# Patient Record
Sex: FEMALE | Race: WHITE | Employment: OTHER | URBAN - METROPOLITAN AREA
[De-identification: names, ages, dates, MRNs, and addresses within clinical notes are randomized per-mention and may not be internally consistent; named-entity substitution may affect disease eponyms.]

---

## 2017-08-23 ENCOUNTER — HOSPITAL ENCOUNTER (OUTPATIENT)
Dept: OTHER | Age: 67
Discharge: HOME | End: 2017-08-23
Payer: MEDICARE

## 2017-08-23 ENCOUNTER — HOSPITAL ENCOUNTER (OUTPATIENT)
Dept: PREADMISSION TESTING | Age: 67
Discharge: HOME | End: 2017-08-23
Payer: MEDICARE

## 2017-08-23 ENCOUNTER — HOSPITAL ENCOUNTER (OUTPATIENT)
Dept: LAB | Age: 67
Discharge: HOME | End: 2017-08-23
Payer: MEDICARE

## 2017-08-23 DIAGNOSIS — Z01.818 PRE-OP TESTING: ICD-10-CM

## 2017-08-23 DIAGNOSIS — M43.16 SPONDYLOLISTHESIS OF LUMBAR REGION: ICD-10-CM

## 2017-08-23 DIAGNOSIS — M41.9 KYPHOSCOLIOSIS: ICD-10-CM

## 2017-08-23 DIAGNOSIS — M48.061 SPINAL STENOSIS, LUMBAR REGION, WITHOUT NEUROGENIC CLAUDICATION: ICD-10-CM

## 2017-08-23 LAB
ABO + RH BLD: NORMAL
ALBUMIN SERPL-MCNC: 3.9 G/DL (ref 3.4–5)
ALBUMIN/GLOB SERPL: 1.1 {RATIO} (ref 0.8–1.7)
ALP SERPL-CCNC: 81 U/L (ref 45–117)
ALT SERPL-CCNC: 28 U/L (ref 13–56)
ANION GAP SERPL CALC-SCNC: 7 MMOL/L (ref 3–18)
APPEARANCE UR: CLEAR
APTT PPP: 28.1 SEC (ref 23–36.4)
AST SERPL-CCNC: 21 U/L (ref 15–37)
ATRIAL RATE: 87 BPM
BACTERIA URNS QL MICRO: NEGATIVE /HPF
BASOPHILS # BLD: 0 K/UL (ref 0–0.06)
BASOPHILS NFR BLD: 0 % (ref 0–2)
BILIRUB SERPL-MCNC: 0.6 MG/DL (ref 0.2–1)
BILIRUB UR QL: NEGATIVE
BLOOD GROUP ANTIBODIES SERPL: NORMAL
BUN SERPL-MCNC: 11 MG/DL (ref 7–18)
BUN/CREAT SERPL: 16 (ref 12–20)
CALCIUM SERPL-MCNC: 8.9 MG/DL (ref 8.5–10.1)
CALCULATED P AXIS, ECG09: 59 DEGREES
CALCULATED R AXIS, ECG10: 30 DEGREES
CALCULATED T AXIS, ECG11: 55 DEGREES
CHLORIDE SERPL-SCNC: 104 MMOL/L (ref 100–108)
CO2 SERPL-SCNC: 26 MMOL/L (ref 21–32)
COLOR UR: YELLOW
CREAT SERPL-MCNC: 0.68 MG/DL (ref 0.6–1.3)
DIAGNOSIS, 93000: NORMAL
DIFFERENTIAL METHOD BLD: ABNORMAL
EOSINOPHIL # BLD: 0 K/UL (ref 0–0.4)
EOSINOPHIL NFR BLD: 0 % (ref 0–5)
EPITH CASTS URNS QL MICRO: ABNORMAL /LPF (ref 0–5)
ERYTHROCYTE [DISTWIDTH] IN BLOOD BY AUTOMATED COUNT: 14.7 % (ref 11.6–14.5)
GLOBULIN SER CALC-MCNC: 3.6 G/DL (ref 2–4)
GLUCOSE SERPL-MCNC: 86 MG/DL (ref 74–99)
GLUCOSE UR STRIP.AUTO-MCNC: NEGATIVE MG/DL
HCT VFR BLD AUTO: 40 % (ref 35–45)
HGB BLD-MCNC: 13.1 G/DL (ref 12–16)
HGB UR QL STRIP: NEGATIVE
INR PPP: 1.1 (ref 0.8–1.2)
KETONES UR QL STRIP.AUTO: NEGATIVE MG/DL
LEUKOCYTE ESTERASE UR QL STRIP.AUTO: ABNORMAL
LYMPHOCYTES # BLD: 2.4 K/UL (ref 0.9–3.6)
LYMPHOCYTES NFR BLD: 33 % (ref 21–52)
MCH RBC QN AUTO: 29.3 PG (ref 24–34)
MCHC RBC AUTO-ENTMCNC: 32.8 G/DL (ref 31–37)
MCV RBC AUTO: 89.5 FL (ref 74–97)
MONOCYTES # BLD: 0.8 K/UL (ref 0.05–1.2)
MONOCYTES NFR BLD: 12 % (ref 3–10)
NEUTS SEG # BLD: 4 K/UL (ref 1.8–8)
NEUTS SEG NFR BLD: 55 % (ref 40–73)
NITRITE UR QL STRIP.AUTO: NEGATIVE
P-R INTERVAL, ECG05: 160 MS
PH UR STRIP: 7.5 [PH] (ref 5–8)
PLATELET # BLD AUTO: 306 K/UL (ref 135–420)
PMV BLD AUTO: 10.8 FL (ref 9.2–11.8)
POTASSIUM SERPL-SCNC: 4.4 MMOL/L (ref 3.5–5.5)
PROT SERPL-MCNC: 7.5 G/DL (ref 6.4–8.2)
PROT UR STRIP-MCNC: NEGATIVE MG/DL
PROTHROMBIN TIME: 13.2 SEC (ref 11.5–15.2)
Q-T INTERVAL, ECG07: 338 MS
QRS DURATION, ECG06: 82 MS
QTC CALCULATION (BEZET), ECG08: 406 MS
RBC # BLD AUTO: 4.47 M/UL (ref 4.2–5.3)
RBC #/AREA URNS HPF: 0 /HPF (ref 0–5)
SODIUM SERPL-SCNC: 137 MMOL/L (ref 136–145)
SP GR UR REFRACTOMETRY: 1.01 (ref 1–1.03)
SPECIMEN EXP DATE BLD: NORMAL
UROBILINOGEN UR QL STRIP.AUTO: 0.2 EU/DL (ref 0.2–1)
VENTRICULAR RATE, ECG03: 87 BPM
WBC # BLD AUTO: 7.2 K/UL (ref 4.6–13.2)
WBC URNS QL MICRO: ABNORMAL /HPF (ref 0–4)

## 2017-08-23 PROCEDURE — 81001 URINALYSIS AUTO W/SCOPE: CPT | Performed by: NEUROLOGICAL SURGERY

## 2017-08-23 PROCEDURE — 85730 THROMBOPLASTIN TIME PARTIAL: CPT | Performed by: NEUROLOGICAL SURGERY

## 2017-08-23 PROCEDURE — 71020 XR CHEST PA LAT: CPT

## 2017-08-23 PROCEDURE — 85025 COMPLETE CBC W/AUTO DIFF WBC: CPT | Performed by: NEUROLOGICAL SURGERY

## 2017-08-23 PROCEDURE — 85610 PROTHROMBIN TIME: CPT | Performed by: NEUROLOGICAL SURGERY

## 2017-08-23 PROCEDURE — 87086 URINE CULTURE/COLONY COUNT: CPT | Performed by: NEUROLOGICAL SURGERY

## 2017-08-23 PROCEDURE — 93005 ELECTROCARDIOGRAM TRACING: CPT

## 2017-08-23 PROCEDURE — 36415 COLL VENOUS BLD VENIPUNCTURE: CPT | Performed by: NEUROLOGICAL SURGERY

## 2017-08-23 PROCEDURE — 86900 BLOOD TYPING SEROLOGIC ABO: CPT | Performed by: NEUROLOGICAL SURGERY

## 2017-08-23 PROCEDURE — 80053 COMPREHEN METABOLIC PANEL: CPT | Performed by: NEUROLOGICAL SURGERY

## 2017-08-23 RX ORDER — ETANERCEPT 50 MG/ML
50 SOLUTION SUBCUTANEOUS
Refills: 0 | COMMUNITY
Start: 2017-08-09

## 2017-08-23 RX ORDER — FLUOXETINE HYDROCHLORIDE 20 MG/1
1 CAPSULE ORAL DAILY
Refills: 0 | COMMUNITY
Start: 2017-07-14

## 2017-08-23 RX ORDER — LISINOPRIL 20 MG/1
1 TABLET ORAL DAILY
Refills: 0 | COMMUNITY
Start: 2017-07-18

## 2017-08-23 NOTE — PERIOP NOTES
PAT - SURGICAL PRE-ADMISSION INSTRUCTIONS    NAME:  Best Arroyo                                                          TODAY'S DATE:  8/23/2017    SURGERY DATE:  8/29/2017                                  SURGERY ARRIVAL TIME:   0600    1. Do NOT eat or drink anything, including candy or gum, after MIDNIGHT on 08/28/2017 , unless you have specific instructions from your Surgeon or Anesthesia Provider to do so. 2. No smoking on the day of surgery. 3. No alcohol 24 hours prior to the day of surgery. 4. No recreational drugs for one week prior to the day of surgery. 5. Leave all valuables, including money/purse, at home. 6. Remove all jewelry, nail polish, makeup (including mascara); no lotions, powders, deodorant, or perfume/cologne/after shave. 7. Glasses/Contact lenses and Dentures may be worn to the hospital.  They will be removed prior to surgery. 8. Call your doctor if symptoms of a cold or illness develop within 24 ours prior to surgery. 9. AN ADULT MUST DRIVE YOU HOME AFTER OUTPATIENT SURGERY. 10. If you are having an OUTPATIENT procedure, please make arrangements for a responsible adult to be with you for 24 hours after your surgery. 11. If you are admitted to the hospital, you will be assigned to a bed after surgery is complete. Normally a family member will not be able to see you until you are in your assigned bed. 15. Family is encouraged to accompany you to the hospital.  We ask visitors in the treatment area to be limited to ONE person at a time to ensure patient privacy. EXCEPTIONS WILL BE MADE AS NEEDED. 15. Children under 12 are discouraged from entering the treatment area and need to be supervised by an adult when in the waiting room. Special Instructions:     Take these medications the morning of surgery with a sip of water:  take blood pressure pills, Bring any pertinent legal medical records., STOP anticoagulants AT LEAST 1 WEEK PRIOR to your surgery or, follow other MD instructions:  stop Birdieel on 8/23/2017    Patient Prep:    use CHG solution    These surgical instructions were reviewed with Patricia Amaya during the PAT visit  A printed copy of the instructions was provided to Patricia Amaya    Directions: On the morning of surgery, please go to the [facility]. Enter the building from the [facility] entrance, 1st floor (next to the Emergency Room entrance). Take the elevator to the 2nd floor. Sign in at the Registration Desk.     If you have any questions and/or concerns, please do not hesitate to call:  (Prior to the day of surgery)  [facility] unit:  [phone]  (Day of surgery)  [facility] unit:  [phone]

## 2017-08-25 LAB
BACTERIA SPEC CULT: NORMAL
SERVICE CMNT-IMP: NORMAL

## 2017-08-28 ENCOUNTER — ANESTHESIA EVENT (OUTPATIENT)
Dept: SURGERY | Age: 67
DRG: 454 | End: 2017-08-28
Payer: MEDICARE

## 2017-08-29 ENCOUNTER — HOSPITAL ENCOUNTER (INPATIENT)
Age: 67
LOS: 7 days | Discharge: NURSING, REHAB OR LONG-TERM CARE | DRG: 454 | End: 2017-09-05
Attending: NEUROLOGICAL SURGERY | Admitting: NEUROLOGICAL SURGERY
Payer: MEDICARE

## 2017-08-29 ENCOUNTER — APPOINTMENT (OUTPATIENT)
Dept: GENERAL RADIOLOGY | Age: 67
DRG: 454 | End: 2017-08-29
Attending: NEUROLOGICAL SURGERY
Payer: MEDICARE

## 2017-08-29 ENCOUNTER — ANESTHESIA (OUTPATIENT)
Dept: SURGERY | Age: 67
DRG: 454 | End: 2017-08-29
Payer: MEDICARE

## 2017-08-29 PROBLEM — M41.9 LUMBAR SCOLIOSIS: Status: ACTIVE | Noted: 2017-08-29

## 2017-08-29 PROCEDURE — 77030011265 HC ELECTRD BLD HEX COVD -A: Performed by: NEUROLOGICAL SURGERY

## 2017-08-29 PROCEDURE — 74011000250 HC RX REV CODE- 250

## 2017-08-29 PROCEDURE — 77030018673: Performed by: NEUROLOGICAL SURGERY

## 2017-08-29 PROCEDURE — 74011250636 HC RX REV CODE- 250/636: Performed by: NURSE ANESTHETIST, CERTIFIED REGISTERED

## 2017-08-29 PROCEDURE — 77030027703 HC MAXCESS 4 KT DISP NUVA -H: Performed by: NEUROLOGICAL SURGERY

## 2017-08-29 PROCEDURE — 74011250637 HC RX REV CODE- 250/637: Performed by: INTERNAL MEDICINE

## 2017-08-29 PROCEDURE — 74011250636 HC RX REV CODE- 250/636

## 2017-08-29 PROCEDURE — 77030020486 HC ELECTRD EMG KT NUVA -G1: Performed by: NEUROLOGICAL SURGERY

## 2017-08-29 PROCEDURE — 77030032490 HC SLV COMPR SCD KNE COVD -B: Performed by: NEUROLOGICAL SURGERY

## 2017-08-29 PROCEDURE — 77030020271 HC MISC IMPL NEURO: Performed by: NEUROLOGICAL SURGERY

## 2017-08-29 PROCEDURE — 77030026918 HC ADMN ST IV BLD BD -A: Performed by: NURSE ANESTHETIST, CERTIFIED REGISTERED

## 2017-08-29 PROCEDURE — 77030011266 HC ELECTRD BLD INSL COVD -A: Performed by: NEUROLOGICAL SURGERY

## 2017-08-29 PROCEDURE — 76060000036 HC ANESTHESIA 2.5 TO 3 HR: Performed by: NEUROLOGICAL SURGERY

## 2017-08-29 PROCEDURE — 74011250637 HC RX REV CODE- 250/637: Performed by: NEUROLOGICAL SURGERY

## 2017-08-29 PROCEDURE — 74011250637 HC RX REV CODE- 250/637: Performed by: NURSE ANESTHETIST, CERTIFIED REGISTERED

## 2017-08-29 PROCEDURE — C1713 ANCHOR/SCREW BN/BN,TIS/BN: HCPCS | Performed by: NEUROLOGICAL SURGERY

## 2017-08-29 PROCEDURE — 74011250636 HC RX REV CODE- 250/636: Performed by: NEUROLOGICAL SURGERY

## 2017-08-29 PROCEDURE — 74011000250 HC RX REV CODE- 250: Performed by: NEUROLOGICAL SURGERY

## 2017-08-29 PROCEDURE — 07DR3ZZ EXTRACTION OF ILIAC BONE MARROW, PERCUTANEOUS APPROACH: ICD-10-PCS | Performed by: NEUROLOGICAL SURGERY

## 2017-08-29 PROCEDURE — 77030027138 HC INCENT SPIROMETER -A: Performed by: NEUROLOGICAL SURGERY

## 2017-08-29 PROCEDURE — 0ST20ZZ RESECTION OF LUMBAR VERTEBRAL DISC, OPEN APPROACH: ICD-10-PCS | Performed by: NEUROLOGICAL SURGERY

## 2017-08-29 PROCEDURE — 76010000172 HC OR TIME 2.5 TO 3 HR INTENSV-TIER 1: Performed by: NEUROLOGICAL SURGERY

## 2017-08-29 PROCEDURE — 77030018836 HC SOL IRR NACL ICUM -A: Performed by: NEUROLOGICAL SURGERY

## 2017-08-29 PROCEDURE — 76210000002 HC OR PH I REC 3 TO 3.5 HR: Performed by: NEUROLOGICAL SURGERY

## 2017-08-29 PROCEDURE — 77030008477 HC STYL SATN SLP COVD -A: Performed by: NURSE ANESTHETIST, CERTIFIED REGISTERED

## 2017-08-29 PROCEDURE — 77030008683 HC TU ET CUF COVD -A: Performed by: NURSE ANESTHETIST, CERTIFIED REGISTERED

## 2017-08-29 PROCEDURE — 77030013079 HC BLNKT BAIR HGGR 3M -A: Performed by: NURSE ANESTHETIST, CERTIFIED REGISTERED

## 2017-08-29 PROCEDURE — 77030019908 HC STETH ESOPH SIMS -A: Performed by: NURSE ANESTHETIST, CERTIFIED REGISTERED

## 2017-08-29 PROCEDURE — 77030034169 HC GRFT BN BIOACTV INTRFC 1G BSTEB -F: Performed by: NEUROLOGICAL SURGERY

## 2017-08-29 PROCEDURE — 74011000258 HC RX REV CODE- 258

## 2017-08-29 PROCEDURE — 77030014647 HC SEAL FBRN TISSL BAXT -D: Performed by: NEUROLOGICAL SURGERY

## 2017-08-29 PROCEDURE — 77030010516 HC APPL HEMA CLP TELE -B: Performed by: NEUROLOGICAL SURGERY

## 2017-08-29 PROCEDURE — 77030034171 HC GRFT COLGN SCAFLD BSTE -G: Performed by: NEUROLOGICAL SURGERY

## 2017-08-29 PROCEDURE — 77030020268 HC MISC GENERAL SUPPLY: Performed by: NEUROLOGICAL SURGERY

## 2017-08-29 PROCEDURE — 74011250637 HC RX REV CODE- 250/637: Performed by: NURSE PRACTITIONER

## 2017-08-29 PROCEDURE — 74011000258 HC RX REV CODE- 258: Performed by: NEUROLOGICAL SURGERY

## 2017-08-29 PROCEDURE — 0SG10A0 FUSION OF 2 OR MORE LUMBAR VERTEBRAL JOINTS WITH INTERBODY FUSION DEVICE, ANTERIOR APPROACH, ANTERIOR COLUMN, OPEN APPROACH: ICD-10-PCS | Performed by: NEUROLOGICAL SURGERY

## 2017-08-29 PROCEDURE — 74011000272 HC RX REV CODE- 272: Performed by: NEUROLOGICAL SURGERY

## 2017-08-29 PROCEDURE — 77030011640 HC PAD GRND REM COVD -A: Performed by: NEUROLOGICAL SURGERY

## 2017-08-29 PROCEDURE — 77030030409 HC DIL SPN KT M5 DISP NUVA -G: Performed by: NEUROLOGICAL SURGERY

## 2017-08-29 PROCEDURE — 77030011264 HC ELECTRD BLD EXT COVD -A: Performed by: NEUROLOGICAL SURGERY

## 2017-08-29 PROCEDURE — 65270000029 HC RM PRIVATE

## 2017-08-29 DEVICE — GRAFT BNE SUB 7.2CC W15XL50MM MINERALIZED CLLGN SCFLD: Type: IMPLANTABLE DEVICE | Site: SPINE LUMBAR | Status: FUNCTIONAL

## 2017-08-29 RX ORDER — VANCOMYCIN HYDROCHLORIDE 1 G/20ML
INJECTION, POWDER, LYOPHILIZED, FOR SOLUTION INTRAVENOUS AS NEEDED
Status: DISCONTINUED | OUTPATIENT
Start: 2017-08-29 | End: 2017-08-29 | Stop reason: HOSPADM

## 2017-08-29 RX ORDER — LORAZEPAM 1 MG/1
1 TABLET ORAL
Status: DISCONTINUED | OUTPATIENT
Start: 2017-08-29 | End: 2017-08-31

## 2017-08-29 RX ORDER — METHOCARBAMOL 750 MG/1
750 TABLET, FILM COATED ORAL
Status: DISCONTINUED | OUTPATIENT
Start: 2017-08-29 | End: 2017-09-05 | Stop reason: HOSPADM

## 2017-08-29 RX ORDER — SODIUM CHLORIDE 9 MG/ML
INJECTION, SOLUTION INTRAVENOUS
Status: DISCONTINUED | OUTPATIENT
Start: 2017-08-29 | End: 2017-08-29 | Stop reason: HOSPADM

## 2017-08-29 RX ORDER — SUCCINYLCHOLINE CHLORIDE 20 MG/ML
INJECTION INTRAMUSCULAR; INTRAVENOUS AS NEEDED
Status: DISCONTINUED | OUTPATIENT
Start: 2017-08-29 | End: 2017-08-29 | Stop reason: HOSPADM

## 2017-08-29 RX ORDER — LORAZEPAM 2 MG/ML
3 INJECTION INTRAMUSCULAR
Status: DISCONTINUED | OUTPATIENT
Start: 2017-08-29 | End: 2017-08-31

## 2017-08-29 RX ORDER — FAMOTIDINE 20 MG/1
20 TABLET, FILM COATED ORAL ONCE
Status: COMPLETED | OUTPATIENT
Start: 2017-08-30 | End: 2017-08-29

## 2017-08-29 RX ORDER — LORAZEPAM 2 MG/ML
1 INJECTION INTRAMUSCULAR
Status: DISCONTINUED | OUTPATIENT
Start: 2017-08-29 | End: 2017-08-31

## 2017-08-29 RX ORDER — ONDANSETRON 2 MG/ML
INJECTION INTRAMUSCULAR; INTRAVENOUS AS NEEDED
Status: DISCONTINUED | OUTPATIENT
Start: 2017-08-29 | End: 2017-08-29 | Stop reason: HOSPADM

## 2017-08-29 RX ORDER — DEXAMETHASONE SODIUM PHOSPHATE 4 MG/ML
INJECTION, SOLUTION INTRA-ARTICULAR; INTRALESIONAL; INTRAMUSCULAR; INTRAVENOUS; SOFT TISSUE AS NEEDED
Status: DISCONTINUED | OUTPATIENT
Start: 2017-08-29 | End: 2017-08-29 | Stop reason: HOSPADM

## 2017-08-29 RX ORDER — BUPIVACAINE HYDROCHLORIDE AND EPINEPHRINE 2.5; 5 MG/ML; UG/ML
INJECTION, SOLUTION EPIDURAL; INFILTRATION; INTRACAUDAL; PERINEURAL AS NEEDED
Status: DISCONTINUED | OUTPATIENT
Start: 2017-08-29 | End: 2017-08-29 | Stop reason: HOSPADM

## 2017-08-29 RX ORDER — SODIUM CHLORIDE 0.9 % (FLUSH) 0.9 %
5-10 SYRINGE (ML) INJECTION AS NEEDED
Status: DISCONTINUED | OUTPATIENT
Start: 2017-08-29 | End: 2017-08-29 | Stop reason: HOSPADM

## 2017-08-29 RX ORDER — SODIUM CHLORIDE 0.9 % (FLUSH) 0.9 %
5-10 SYRINGE (ML) INJECTION AS NEEDED
Status: DISCONTINUED | OUTPATIENT
Start: 2017-08-29 | End: 2017-08-29

## 2017-08-29 RX ORDER — SODIUM CHLORIDE 0.9 % (FLUSH) 0.9 %
5-10 SYRINGE (ML) INJECTION EVERY 8 HOURS
Status: DISCONTINUED | OUTPATIENT
Start: 2017-08-29 | End: 2017-08-30

## 2017-08-29 RX ORDER — DIPHENHYDRAMINE HCL 25 MG
25 CAPSULE ORAL
Status: DISCONTINUED | OUTPATIENT
Start: 2017-08-29 | End: 2017-08-30 | Stop reason: DRUGHIGH

## 2017-08-29 RX ORDER — LORAZEPAM 1 MG/1
2 TABLET ORAL
Status: DISCONTINUED | OUTPATIENT
Start: 2017-08-29 | End: 2017-09-05 | Stop reason: HOSPADM

## 2017-08-29 RX ORDER — DIAZEPAM 10 MG/2ML
5 INJECTION INTRAMUSCULAR ONCE
Status: COMPLETED | OUTPATIENT
Start: 2017-08-29 | End: 2017-08-29

## 2017-08-29 RX ORDER — ONDANSETRON 2 MG/ML
4 INJECTION INTRAMUSCULAR; INTRAVENOUS ONCE
Status: DISCONTINUED | OUTPATIENT
Start: 2017-08-29 | End: 2017-08-29

## 2017-08-29 RX ORDER — NALOXONE HYDROCHLORIDE 0.4 MG/ML
0.4 INJECTION, SOLUTION INTRAMUSCULAR; INTRAVENOUS; SUBCUTANEOUS AS NEEDED
Status: DISCONTINUED | OUTPATIENT
Start: 2017-08-29 | End: 2017-09-05 | Stop reason: HOSPADM

## 2017-08-29 RX ORDER — DEXTROSE 50 % IN WATER (D50W) INTRAVENOUS SYRINGE
25-50 AS NEEDED
Status: DISCONTINUED | OUTPATIENT
Start: 2017-08-29 | End: 2017-08-29

## 2017-08-29 RX ORDER — FENTANYL CITRATE 50 UG/ML
INJECTION, SOLUTION INTRAMUSCULAR; INTRAVENOUS
Status: COMPLETED
Start: 2017-08-29 | End: 2017-08-29

## 2017-08-29 RX ORDER — HYDROMORPHONE HYDROCHLORIDE 2 MG/ML
0.5 INJECTION, SOLUTION INTRAMUSCULAR; INTRAVENOUS; SUBCUTANEOUS
Status: COMPLETED | OUTPATIENT
Start: 2017-08-29 | End: 2017-08-29

## 2017-08-29 RX ORDER — SODIUM CHLORIDE, SODIUM LACTATE, POTASSIUM CHLORIDE, CALCIUM CHLORIDE 600; 310; 30; 20 MG/100ML; MG/100ML; MG/100ML; MG/100ML
50 INJECTION, SOLUTION INTRAVENOUS CONTINUOUS
Status: DISCONTINUED | OUTPATIENT
Start: 2017-08-30 | End: 2017-08-29 | Stop reason: HOSPADM

## 2017-08-29 RX ORDER — FAMOTIDINE 20 MG/1
TABLET, FILM COATED ORAL
Status: DISCONTINUED
Start: 2017-08-29 | End: 2017-08-29

## 2017-08-29 RX ORDER — LIDOCAINE HYDROCHLORIDE AND EPINEPHRINE 10; 10 MG/ML; UG/ML
INJECTION, SOLUTION INFILTRATION; PERINEURAL AS NEEDED
Status: DISCONTINUED | OUTPATIENT
Start: 2017-08-29 | End: 2017-08-29 | Stop reason: HOSPADM

## 2017-08-29 RX ORDER — FENTANYL CITRATE 50 UG/ML
INJECTION, SOLUTION INTRAMUSCULAR; INTRAVENOUS AS NEEDED
Status: DISCONTINUED | OUTPATIENT
Start: 2017-08-29 | End: 2017-08-29 | Stop reason: HOSPADM

## 2017-08-29 RX ORDER — MIDAZOLAM HYDROCHLORIDE 1 MG/ML
INJECTION, SOLUTION INTRAMUSCULAR; INTRAVENOUS AS NEEDED
Status: DISCONTINUED | OUTPATIENT
Start: 2017-08-29 | End: 2017-08-29 | Stop reason: HOSPADM

## 2017-08-29 RX ORDER — LORAZEPAM 2 MG/ML
2 INJECTION INTRAMUSCULAR
Status: DISCONTINUED | OUTPATIENT
Start: 2017-08-29 | End: 2017-08-31

## 2017-08-29 RX ORDER — METHOCARBAMOL 100 MG/ML
1000 INJECTION, SOLUTION INTRAMUSCULAR; INTRAVENOUS ONCE
Status: COMPLETED | OUTPATIENT
Start: 2017-08-29 | End: 2017-08-29

## 2017-08-29 RX ORDER — DIPHENHYDRAMINE HYDROCHLORIDE 50 MG/ML
12.5 INJECTION, SOLUTION INTRAMUSCULAR; INTRAVENOUS
Status: DISCONTINUED | OUTPATIENT
Start: 2017-08-29 | End: 2017-08-29

## 2017-08-29 RX ORDER — IBUPROFEN 200 MG
1 TABLET ORAL DAILY
Status: DISCONTINUED | OUTPATIENT
Start: 2017-08-29 | End: 2017-09-05 | Stop reason: HOSPADM

## 2017-08-29 RX ORDER — SODIUM CHLORIDE 0.9 % (FLUSH) 0.9 %
5-10 SYRINGE (ML) INJECTION EVERY 8 HOURS
Status: DISCONTINUED | OUTPATIENT
Start: 2017-08-29 | End: 2017-08-29 | Stop reason: HOSPADM

## 2017-08-29 RX ORDER — HYDROMORPHONE HYDROCHLORIDE 1 MG/ML
INJECTION, SOLUTION INTRAMUSCULAR; INTRAVENOUS; SUBCUTANEOUS AS NEEDED
Status: DISCONTINUED | OUTPATIENT
Start: 2017-08-29 | End: 2017-08-29 | Stop reason: HOSPADM

## 2017-08-29 RX ORDER — ONDANSETRON 2 MG/ML
4 INJECTION INTRAMUSCULAR; INTRAVENOUS
Status: DISCONTINUED | OUTPATIENT
Start: 2017-08-29 | End: 2017-08-31

## 2017-08-29 RX ORDER — SODIUM CHLORIDE 0.9 % (FLUSH) 0.9 %
5-10 SYRINGE (ML) INJECTION AS NEEDED
Status: DISCONTINUED | OUTPATIENT
Start: 2017-08-29 | End: 2017-08-31

## 2017-08-29 RX ORDER — INSULIN LISPRO 100 [IU]/ML
INJECTION, SOLUTION INTRAVENOUS; SUBCUTANEOUS ONCE
Status: DISCONTINUED | OUTPATIENT
Start: 2017-08-29 | End: 2017-08-29

## 2017-08-29 RX ORDER — CEFAZOLIN SODIUM 2 G/50ML
2 SOLUTION INTRAVENOUS ONCE
Status: COMPLETED | OUTPATIENT
Start: 2017-08-29 | End: 2017-08-29

## 2017-08-29 RX ORDER — SCOLOPAMINE TRANSDERMAL SYSTEM 1 MG/1
1.5 PATCH, EXTENDED RELEASE TRANSDERMAL
Status: DISCONTINUED | OUTPATIENT
Start: 2017-08-29 | End: 2017-08-29 | Stop reason: HOSPADM

## 2017-08-29 RX ORDER — DEXTROSE, SODIUM CHLORIDE, AND POTASSIUM CHLORIDE 5; .45; .15 G/100ML; G/100ML; G/100ML
75 INJECTION INTRAVENOUS CONTINUOUS
Status: DISCONTINUED | OUTPATIENT
Start: 2017-08-29 | End: 2017-08-31

## 2017-08-29 RX ORDER — HYDROMORPHONE HYDROCHLORIDE 1 MG/ML
1 INJECTION, SOLUTION INTRAMUSCULAR; INTRAVENOUS; SUBCUTANEOUS
Status: DISCONTINUED | OUTPATIENT
Start: 2017-08-29 | End: 2017-09-05 | Stop reason: HOSPADM

## 2017-08-29 RX ORDER — LIDOCAINE HYDROCHLORIDE 20 MG/ML
INJECTION, SOLUTION EPIDURAL; INFILTRATION; INTRACAUDAL; PERINEURAL AS NEEDED
Status: DISCONTINUED | OUTPATIENT
Start: 2017-08-29 | End: 2017-08-29 | Stop reason: HOSPADM

## 2017-08-29 RX ORDER — HYDROMORPHONE HYDROCHLORIDE 2 MG/ML
0.2 INJECTION, SOLUTION INTRAMUSCULAR; INTRAVENOUS; SUBCUTANEOUS AS NEEDED
Status: DISCONTINUED | OUTPATIENT
Start: 2017-08-29 | End: 2017-08-29

## 2017-08-29 RX ORDER — SODIUM CHLORIDE 0.9 % (FLUSH) 0.9 %
5-10 SYRINGE (ML) INJECTION EVERY 8 HOURS
Status: DISCONTINUED | OUTPATIENT
Start: 2017-08-29 | End: 2017-08-31

## 2017-08-29 RX ORDER — PROPOFOL 10 MG/ML
INJECTION, EMULSION INTRAVENOUS AS NEEDED
Status: DISCONTINUED | OUTPATIENT
Start: 2017-08-29 | End: 2017-08-29 | Stop reason: HOSPADM

## 2017-08-29 RX ORDER — OXYCODONE AND ACETAMINOPHEN 5; 325 MG/1; MG/1
1 TABLET ORAL
Status: DISCONTINUED | OUTPATIENT
Start: 2017-08-29 | End: 2017-09-01

## 2017-08-29 RX ORDER — MAGNESIUM SULFATE 100 %
4 CRYSTALS MISCELLANEOUS AS NEEDED
Status: DISCONTINUED | OUTPATIENT
Start: 2017-08-29 | End: 2017-08-29

## 2017-08-29 RX ORDER — FENTANYL CITRATE 50 UG/ML
100 INJECTION, SOLUTION INTRAMUSCULAR; INTRAVENOUS ONCE
Status: COMPLETED | OUTPATIENT
Start: 2017-08-29 | End: 2017-08-29

## 2017-08-29 RX ORDER — FLUOXETINE HYDROCHLORIDE 20 MG/1
20 CAPSULE ORAL DAILY
Status: DISCONTINUED | OUTPATIENT
Start: 2017-08-30 | End: 2017-09-05 | Stop reason: HOSPADM

## 2017-08-29 RX ORDER — SODIUM CHLORIDE, SODIUM LACTATE, POTASSIUM CHLORIDE, CALCIUM CHLORIDE 600; 310; 30; 20 MG/100ML; MG/100ML; MG/100ML; MG/100ML
100 INJECTION, SOLUTION INTRAVENOUS CONTINUOUS
Status: DISCONTINUED | OUTPATIENT
Start: 2017-08-29 | End: 2017-08-29

## 2017-08-29 RX ORDER — OXYCODONE AND ACETAMINOPHEN 5; 325 MG/1; MG/1
1 TABLET ORAL AS NEEDED
Status: DISCONTINUED | OUTPATIENT
Start: 2017-08-29 | End: 2017-08-29

## 2017-08-29 RX ORDER — BISACODYL 5 MG
5 TABLET, DELAYED RELEASE (ENTERIC COATED) ORAL DAILY PRN
Status: DISCONTINUED | OUTPATIENT
Start: 2017-08-29 | End: 2017-08-31

## 2017-08-29 RX ORDER — LISINOPRIL 20 MG/1
20 TABLET ORAL DAILY
Status: DISCONTINUED | OUTPATIENT
Start: 2017-08-30 | End: 2017-09-05 | Stop reason: HOSPADM

## 2017-08-29 RX ADMIN — SODIUM CHLORIDE: 9 INJECTION, SOLUTION INTRAVENOUS at 08:50

## 2017-08-29 RX ADMIN — FENTANYL CITRATE 50 MCG: 50 INJECTION, SOLUTION INTRAMUSCULAR; INTRAVENOUS at 10:20

## 2017-08-29 RX ADMIN — METHOCARBAMOL 1000 MG: 100 INJECTION INTRAMUSCULAR; INTRAVENOUS at 12:18

## 2017-08-29 RX ADMIN — OXYCODONE HYDROCHLORIDE AND ACETAMINOPHEN 1 TABLET: 5; 325 TABLET ORAL at 18:50

## 2017-08-29 RX ADMIN — FENTANYL CITRATE 100 MCG: 50 INJECTION, SOLUTION INTRAMUSCULAR; INTRAVENOUS at 08:27

## 2017-08-29 RX ADMIN — LORAZEPAM 1 MG: 1 TABLET ORAL at 20:27

## 2017-08-29 RX ADMIN — DEXAMETHASONE SODIUM PHOSPHATE 10 MG: 4 INJECTION, SOLUTION INTRA-ARTICULAR; INTRALESIONAL; INTRAMUSCULAR; INTRAVENOUS; SOFT TISSUE at 08:44

## 2017-08-29 RX ADMIN — DIAZEPAM 5 MG: 5 INJECTION, SOLUTION INTRAMUSCULAR; INTRAVENOUS at 11:25

## 2017-08-29 RX ADMIN — HYDROMORPHONE HYDROCHLORIDE 1 MG: 1 INJECTION, SOLUTION INTRAMUSCULAR; INTRAVENOUS; SUBCUTANEOUS at 20:27

## 2017-08-29 RX ADMIN — HYDROMORPHONE HYDROCHLORIDE 0.5 MG: 2 INJECTION INTRAMUSCULAR; INTRAVENOUS; SUBCUTANEOUS at 11:40

## 2017-08-29 RX ADMIN — DEXTROSE MONOHYDRATE, SODIUM CHLORIDE, AND POTASSIUM CHLORIDE 75 ML/HR: 50; 4.5; 1.49 INJECTION, SOLUTION INTRAVENOUS at 14:32

## 2017-08-29 RX ADMIN — FENTANYL CITRATE 100 MCG: 50 INJECTION, SOLUTION INTRAMUSCULAR; INTRAVENOUS at 13:14

## 2017-08-29 RX ADMIN — CEFAZOLIN SODIUM 1 G: 1 INJECTION, POWDER, FOR SOLUTION INTRAMUSCULAR; INTRAVENOUS at 15:42

## 2017-08-29 RX ADMIN — HYDROMORPHONE HYDROCHLORIDE 0.5 MG: 2 INJECTION INTRAMUSCULAR; INTRAVENOUS; SUBCUTANEOUS at 11:10

## 2017-08-29 RX ADMIN — LIDOCAINE HYDROCHLORIDE 100 MG: 20 INJECTION, SOLUTION EPIDURAL; INFILTRATION; INTRACAUDAL; PERINEURAL at 08:27

## 2017-08-29 RX ADMIN — OXYCODONE HYDROCHLORIDE AND ACETAMINOPHEN 1 TABLET: 5; 325 TABLET ORAL at 22:57

## 2017-08-29 RX ADMIN — CEFAZOLIN SODIUM 1 G: 1 INJECTION, POWDER, FOR SOLUTION INTRAMUSCULAR; INTRAVENOUS at 23:01

## 2017-08-29 RX ADMIN — HYDROMORPHONE HYDROCHLORIDE 1 MG: 1 INJECTION, SOLUTION INTRAMUSCULAR; INTRAVENOUS; SUBCUTANEOUS at 08:44

## 2017-08-29 RX ADMIN — CEFAZOLIN SODIUM 2 G: 2 SOLUTION INTRAVENOUS at 08:27

## 2017-08-29 RX ADMIN — SODIUM CHLORIDE, SODIUM LACTATE, POTASSIUM CHLORIDE, AND CALCIUM CHLORIDE 50 ML/HR: 600; 310; 30; 20 INJECTION, SOLUTION INTRAVENOUS at 07:30

## 2017-08-29 RX ADMIN — FENTANYL CITRATE 50 MCG: 50 INJECTION, SOLUTION INTRAMUSCULAR; INTRAVENOUS at 08:55

## 2017-08-29 RX ADMIN — HYDROMORPHONE HYDROCHLORIDE 0.5 MG: 1 INJECTION, SOLUTION INTRAMUSCULAR; INTRAVENOUS; SUBCUTANEOUS at 10:52

## 2017-08-29 RX ADMIN — Medication 10 ML: at 20:31

## 2017-08-29 RX ADMIN — PROPOFOL 150 MG: 10 INJECTION, EMULSION INTRAVENOUS at 08:42

## 2017-08-29 RX ADMIN — HYDROMORPHONE HYDROCHLORIDE 0.5 MG: 2 INJECTION INTRAMUSCULAR; INTRAVENOUS; SUBCUTANEOUS at 11:50

## 2017-08-29 RX ADMIN — Medication 10 ML: at 20:30

## 2017-08-29 RX ADMIN — HYDROMORPHONE HYDROCHLORIDE 1 MG: 1 INJECTION, SOLUTION INTRAMUSCULAR; INTRAVENOUS; SUBCUTANEOUS at 16:29

## 2017-08-29 RX ADMIN — FAMOTIDINE 20 MG: 20 TABLET ORAL at 07:30

## 2017-08-29 RX ADMIN — HYDROMORPHONE HYDROCHLORIDE 0.5 MG: 2 INJECTION INTRAMUSCULAR; INTRAVENOUS; SUBCUTANEOUS at 11:30

## 2017-08-29 RX ADMIN — PROPOFOL 50 MG: 10 INJECTION, EMULSION INTRAVENOUS at 08:55

## 2017-08-29 RX ADMIN — Medication 10 ML: at 14:00

## 2017-08-29 RX ADMIN — SUCCINYLCHOLINE CHLORIDE 100 MG: 20 INJECTION INTRAMUSCULAR; INTRAVENOUS at 08:42

## 2017-08-29 RX ADMIN — SODIUM CHLORIDE, SODIUM LACTATE, POTASSIUM CHLORIDE, AND CALCIUM CHLORIDE: 600; 310; 30; 20 INJECTION, SOLUTION INTRAVENOUS at 10:37

## 2017-08-29 RX ADMIN — HYDROMORPHONE HYDROCHLORIDE 0.5 MG: 1 INJECTION, SOLUTION INTRAMUSCULAR; INTRAVENOUS; SUBCUTANEOUS at 11:01

## 2017-08-29 RX ADMIN — ONDANSETRON 4 MG: 2 INJECTION INTRAMUSCULAR; INTRAVENOUS at 10:30

## 2017-08-29 RX ADMIN — MIDAZOLAM HYDROCHLORIDE 2 MG: 1 INJECTION, SOLUTION INTRAMUSCULAR; INTRAVENOUS at 08:15

## 2017-08-29 RX ADMIN — DIAZEPAM 5 MG: 5 INJECTION, SOLUTION INTRAMUSCULAR; INTRAVENOUS at 12:10

## 2017-08-29 RX ADMIN — OXYCODONE HYDROCHLORIDE AND ACETAMINOPHEN 1 TABLET: 5; 325 TABLET ORAL at 14:31

## 2017-08-29 NOTE — CONSULTS
[facility]  Hospitalist Division    Consult Note    Patient: Josue Ludwig MRN: [id number]  CSN: [id number]    YOB: 1950  Age: 78 y.o. Sex: female    DOA: 8/29/2017 LOS:  LOS: 0 days        Requesting Physician: Dr. Blane Weber   Reason for Consultation:  Medical Management    Chief Complaint:  Back pain     Assessment/Plan     Patient Active Problem List   Diagnosis Code    Lumbar scoliosis M41.9       A/P:  - S/p L2-L3 anterolateral/retroperitoneal interbody fusion, left iliac crest needle aspiration: diet and mobilization per NS Team   - HTN: Lisinopril   - Depression: Fluoxetine   - ETOH abuse: CIWA protocol   - Tobacco abuse: Nicoderm  - DVT prophylaxis: SCDs     HPI:     Josue Ludwig is a 78 y.o. female with a hx of HTN, current 1 PPD smoker, Depression, ETOH abuse of 3-4 glasses of wine per day and lumbar scoliosis who underwent L2-L3 anterolateral/retroperitoneal interbody fusion, left iliac crest needle aspiration: diet and mobilization per NS Team. Patient reports ongoing back pain for the past 5-6 years. Patient noticed an increase in her back pain over the past several months. Patient stated her pain was throbbing in nature without radiation or numbness or tingling to BLE. Patient denies previous medical history to include CVA, TIA, MI, DM or thyroid disorders. Hospitalist Team is consulted for ongoing medical management. Past Medical History:   Diagnosis Date    Arthritis     Soriatic    Hypertension     Nausea & vomiting        Past Surgical History:   Procedure Laterality Date    HX BREAST BIOPSY      HX ORTHOPAEDIC Bilateral     carpal tunnel    HX ORTHOPAEDIC Bilateral     Bunnionectomy    HX ORTHOPAEDIC Right     torn Meniscus    HX ORTHOPAEDIC Left     2nd toe       History reviewed. No pertinent family history.     Social History     Social History    Marital status: SINGLE     Spouse name: N/A    Number of children: N/A  Years of education: N/A     Social History Main Topics    Smoking status: Current Every Day Smoker     Packs/day: 1.00    Smokeless tobacco: Never Used    Alcohol use Yes      Comment: socally    Drug use: No    Sexual activity: Not Asked     Other Topics Concern    None     Social History Narrative       Prior to Admission medications    Medication Sig Start Date End Date Taking? Authorizing Provider   lisinopril (PRINIVIL, ZESTRIL) 20 mg tablet Take 1 Tab by mouth daily. 7/18/17  Yes Historical Provider   ENBREL SURECLICK 50 mg/mL (2.03 mL) injection 50 mg by Injection route every seven (7) days. 8/9/17   Historical Provider   FLUoxetine (PROZAC) 20 mg capsule Take 1 Cap by mouth daily. 7/14/17   Historical Provider       Allergies   Allergen Reactions    Augmentin [Amoxicillin-Pot Clavulanate] Nausea and Vomiting       Review of Systems  - fever, - chills, - fatigue, - weight loss, - night sweats   - sore throat, - sinus congestion, - lymphadenopathy, - vision changes  - CP, -  palpitations  - dyspnea on exertion, - dyspnea at rest, - cough, - hemoptysis  - nausea, - vomiting, - diarrhea, - abdominal pain, - reflux, - dysphagia  - dysuria, - hematuria, - urinary frequency  - rash, - pruritis  + back pain, - neck pain, - myalgia, - arthralgia  - H/A, - numbness, - tingling, - weakness, - slurred speech    Physical Exam:      Visit Vitals    /67    Pulse (!) 113    Temp 98 °F (36.7 °C)    Resp 26    Ht 5' 3\" (1.6 m)    Wt 78.1 kg (172 lb 2 oz)    SpO2 98%    BMI 30.49 kg/m2       Physical Exam:  Gen: In general, this is a well nourished  female in no acute distress on 2L NC.  HEENT: Sclerae nonicteric. Oral mucous membranes moist.    Neck: Supple with midline trachea. CV: RRR without murmur or rub appreciated. Resp:Respirations are unlabored without use of accessory muscles. Lung fields bilaterally without wheezes or rhonchi. Abd: Soft, nontender, nondistended.  Normoactive bowel sounds. Extrem: Extremities are warm, without cyanosis or clubbing. No pitting pretibial edema. Palpable distal pulses X 4.   Skin: Warm, no visible rashes. Left lateral abdominal incision with Dermabond   Neuro: Patient is alert, oriented, and cooperative. No obvious focal defects. Moves all 4 extremities. Labs Reviewed:    No results found for this or any previous visit (from the past 24 hour(s)).             Jaquan Quiroz, [facility]  [facility] 1948 [facility]  Hospitalist Division  Pager:  [phone]  Office:  [phone]

## 2017-08-29 NOTE — ANESTHESIA PREPROCEDURE EVALUATION
Anesthetic History     PONV          Review of Systems / Medical History  Patient summary reviewed and pertinent labs reviewed    Pulmonary          Smoker         Neuro/Psych   Within defined limits           Cardiovascular    Hypertension: well controlled              Exercise tolerance: >4 METS     GI/Hepatic/Renal                Endo/Other        Arthritis     Other Findings   Comments:   Risk Factors for Postoperative nausea/vomiting:       History of postoperative nausea/vomiting? YES       Female? YES       Motion sickness? NO       Intended opioid administration for postoperative analgesia? YES      Smoking Abstinence  Current Smoker? YES  Elective Surgery? YES  Seen preoperatively by anesthesiologist or proxy prior to day of surgery? YES  Pt abstained from smoking 24 hours prior to anesthesia?  YES           Physical Exam    Airway  Mallampati: III  TM Distance: 4 - 6 cm  Neck ROM: normal range of motion   Mouth opening: Diminished (comment)     Cardiovascular    Rhythm: regular  Rate: normal         Dental    Dentition: Poor dentition     Pulmonary  Breath sounds clear to auscultation               Abdominal  GI exam deferred       Other Findings            Anesthetic Plan    ASA: 2  Anesthesia type: general          Induction: Intravenous  Anesthetic plan and risks discussed with: Patient

## 2017-08-29 NOTE — OP NOTES
[facility]    Name:  Radha Mcclendon  MR#:  [id number]  :  1950  Account #:  [de-identified]  Date of Adm:  2017  Date of Surgery:  2017      PREOPERATIVE DIAGNOSES  1. Lumbar scoliosis. 2. Lumbar spondylolisthesis (L4-5). 3. Lumbar stenosis. POSTOPERATIVE DIAGNOSES  1. Lumbar scoliosis. 2. Lumbar spondylolisthesis (L4-5). 3. Lumbar stenosis. PROCEDURES PERFORMED  1. Left L2-3, L3-4 anterolateral/retroperitoneal interbody fusion. 2. Left iliac crest needle aspiration for the purpose of grafting. ESTIMATED BLOOD LOSS: Minimal.    SPECIMENS REMOVED: No specimens. ANESTHESIA: General endotracheal.    SURGEON: MD Arturo Pedersen    COMPLICATIONS: No complications. BRIEF HISTORY: (Please see admitting history and physical for full  details). This patient is a 23-year-old female who presents with  intractable low back and lower extremity pain. She has lumbar  scoliosis, in addition to her L4-5 spondylolisthesis, stenosis. She has  failed conservative therapy. DESCRIPTION OF PROCEDURE: After informed consent was  obtained, the patient was taken back to the operating room and placed  under general anesthesia without event. She was rolled into the right  lateral decubitus (left side up) position, after the electrodes were  placed by Impulse Monitoring Villaruslan Arcos). She was taped in the usual manner. All pressure points were padded  and an axillary roll was placed. Pillows were placed between her arms  or legs. Her neck was kept neutral. She was taped in the usual  manner, as I said, and then the table was broken. Just prior to rolling,  the left iliac crest was prepped out with alcohol soaked 4 x 4's. ChloraPrep was applied and allowed to dry. Standard draping ensued.   A stab incision was made after sterile draping, and this was right over  the iliac crest. A Jamshidi needle was then driven into the iliac crest  and 50 mL of bone marrow aspirate was obtained. This processed in  the ART-21 system. The buffy coat was then retrieved and brought up to  volume with platelet-depleted plasma. This was mixed with Osteomatrix and Signafuse putty. This was  placed in a sterile medicine cup and covered on the field. Next, a small amount of FloSeal was injected in the needle and stylet  was replaced. The needles were withdrawn and pressure was held. Prineo was applied and allowed to dry. The left flank region was then prepped out with alcohol soaked 4 x 4's. Chlorhexidine scrub was performed then blotted dry with a sterile  towel. ChloraPrep was applied and allowed to dry. The table was  broken to help facilitate access. Standard draping ensued. The L2-3  and L3-4 disks were identified fluoroscopically and marked out. A  transverse incision was made midway between the 2 after it was  infiltrated with 1% lidocaine with epinephrine. A #10 blade was used to  incise the skin. Blunt finger dissection was used to gently dissect  through the lateral body wall musculature and a pop was felt going  through the transversalis fascia. The retroperitoneal fat was clearly  identified after this and all normal retroperitoneal structures were felt. No peritoneum was visualized. The retroperitoneal fat was swept  forward and the first dilator was docked at L3-4, the posterior aspect of  the disk space (between zone 1 and zone 2). It was left on the surface  of the psoas and then, using monitor, it was gently dilated down in a to  and fro fashion, twisting until the disk was felt. All stimulation patterns  were satisfactory. A K-wire was placed and the remaining dilators were  placed. The MaXcess retractor was docked down and opened. The  area was stimulated right in front of the blade and all around. There  was no drop in milliamps. A metal naomi was placed and the wire was  removed.  The muscle fibers were swept forward and retractor blade  was placed anteriorly. Again, the area was stimulated with no drop in  milliamps. The annulotomy knife was used to cut the annulus, and a  straight Singleton was used to release the contralateral annulus superiorly  and inferiorly in a controlled manner. Pituitary rongeur was used to  help empty the disk space and then 6, 8, and finally 10 mm dilators  were used. The remaining disk fragments were removed and a ring  curet was used to clean the rest of the endplates off. The metal glides  were placed and then a L2-3 XL modulus titanium 3D printed cage,  packed with the grafting material was tapped into the disk space. The  metal glides were removed and the graft schaefer was removed. The  wound was copiously irrigated with bacitracin-laden irrigation. The  FloSeal was applied to the wound and the excess was irrigated away. The anterior retractor blade and the metal naomi were removed. The  retractor was removed while visualizing the psoas come back together  in the retroperitoneal contents. The same procedure outlined above was carried out at L2-3. Again, a  10 x 18 x 50, 10 degree XL modulus titanium 3D printed cage was  used. After confirming meticulous hemostasis, the wound was closed  in a layered fashion with the final being a 4-0 Monocryl subcuticular. Prineo was applied and allowed to dry. The patient tolerated the procedure well. All counts were correct before  closing. In the PACU, she had full-strength of quads and had excellent  hip flexor strength. AP and lateral fluoroscopy showed good position of the  instrumentation. Free running EMGs were quiet. I updated the patient's  family in the waiting area. All counts were correct before closing.         MD SHYAM Graham / ALEXANDER  D:  08/29/2017   12:19  T:  08/29/2017   13:02  Job #:  [id number]

## 2017-08-29 NOTE — PROGRESS NOTES
conducted a pre-surgery visit with Fam Plunkett, who is a 78 y.o.,female. The  provided the following Interventions:  Initiated a relationship of care and support. Offered prayer and assurance of continued prayers on patient's behalf. Plan:  Chaplains will continue to follow and will provide pastoral care on an as needed/requested basis.  recommends bedside caregivers page  on duty if patient shows signs of acute spiritual or emotional distress.     [facility]   Spiritual Care   [phone]

## 2017-08-29 NOTE — PROGRESS NOTES
1400 Patient arrived to the unit in stable condition. 1415 Patient is in a lot of pain at this time. She arrived to the unit in a 9/10 pain. Patient can not have any IV pain medication at this time. Will give patient oral pain medication at this time. Family member at the patient's bedside. Call bell is within reach. 1500 Patient is resting comfortably in the bed at this time. Call bell is within reach. 1630 Patient is complaining of pain at this time. Will give the patient IV pain medication at this time. Call bell is within reach. Family member is at the bedside. 1800 Patient requested to get out of bed and ambulated to the bathroom at this time. Patient was able to void without difficulties. She was assisted back to the bed with call bell placed within reach. Bedside and Verbal shift change report given to Rosio Schmidt RN (oncoming nurse) by Freddy Harris RN (offgoing nurse). Report included the following information SBAR, Kardex and MAR.

## 2017-08-29 NOTE — ANESTHESIA POSTPROCEDURE EVALUATION
Post-Anesthesia Evaluation and Assessment    Patient: Ignacio Amanda MRN: [id number]  SSN: [id number]    YOB: 1950  Age: 78 y.o. Sex: female       Cardiovascular Function/Vital Signs  Visit Vitals    /58    Pulse (!) 109    Temp 36.9 °C (98.5 °F)    Resp 20    Ht 5' 3\" (1.6 m)    Wt 78.1 kg (172 lb 2 oz)    SpO2 97%    BMI 30.49 kg/m2       Patient is status post general anesthesia for Procedure(s):  l2-3,l3-4 anterolateral/ RETROPERITONEAL interbody fusion , left iliac crest needle aspiration. Nausea/Vomiting: None    Postoperative hydration reviewed and adequate. Pain:  Pain Scale 1: Visual (08/29/17 1230)  Pain Intensity 1: 10 (08/29/17 1150)   Managed    Neurological Status:   Neuro (WDL): Within Defined Limits (08/29/17 1230)  Neuro  LUE Motor Response: Purposeful (08/29/17 1230)  LLE Motor Response: Purposeful (08/29/17 1230)  RUE Motor Response: Purposeful (08/29/17 1230)  RLE Motor Response: Purposeful (08/29/17 1230)   At baseline    Mental Status and Level of Consciousness: Arousable    Pulmonary Status:   O2 Device: Nasal cannula (08/29/17 1230)   Adequate oxygenation and airway patent    Complications related to anesthesia: None    Post-anesthesia assessment completed.  No concerns    Signed By: Alhaji Gallegos MD     August 29, 2017

## 2017-08-29 NOTE — PERIOP NOTES
TRANSFER - OUT REPORT:    Verbal report given to chele daniels(name) on Arturo Calvert  being transferred to [facility](unit) for routine post - op       Report consisted of patients Situation, Background, Assessment and   Recommendations(SBAR). Information from the following report(s) SBAR, OR Summary, Intake/Output and MAR was reviewed with the receiving nurse. Lines:   Peripheral IV 08/29/17 Right Hand (Active)   Site Assessment Clean, dry, & intact 8/29/2017 11:00 AM   Phlebitis Assessment 0 8/29/2017 11:00 AM   Infiltration Assessment 0 8/29/2017 11:00 AM   Dressing Status Clean, dry, & intact 8/29/2017 11:00 AM   Dressing Type Transparent;Tape 8/29/2017 11:00 AM   Hub Color/Line Status Infusing;Pink 8/29/2017 11:00 AM       Peripheral IV 08/29/17 Left Hand (Active)   Site Assessment Clean, dry, & intact 8/29/2017 11:00 AM   Phlebitis Assessment 0 8/29/2017 11:00 AM   Infiltration Assessment 0 8/29/2017 11:00 AM   Dressing Status Clean, dry, & intact 8/29/2017 11:00 AM   Dressing Type Transparent;Tape 8/29/2017 11:00 AM   Hub Color/Line Status Green;Capped 8/29/2017 11:00 AM        Opportunity for questions and clarification was provided.       Patient transported with:   Post-i

## 2017-08-29 NOTE — BRIEF OP NOTE
BRIEF OPERATIVE NOTE    Date of Procedure: 8/29/2017   Preoperative Diagnosis: lumbar stenosis/stenosis/unstable spondylolisthesis  m41.9,m43.16,m48.06  Postoperative Diagnosis: lumbar stenosis/stenosis/unstable spondylolisthesis  m41.9,m43.16,m48.06    Procedure(s):  l2-3,l3-4 anterolateral/ RETROPERITONEAL interbody fusion , left iliac crest needle aspiration  Surgeon(s) and Role:     * Christelle Stanton MD - Primary         Assistant Staff:       Surgical Staff:  Circ-1: Ebenezer Mcginnis RN  Radiology Technician: Janet العلي  Scrub Tech-1: Kenyetta Prutet  Surg Asst-1: Ricardo Weinstein  Event Time In   Incision Start 2148   Incision Close 1045     Anesthesia: General   Estimated Blood Loss: min  Specimens: * No specimens in log *   Findings: as expected   Complications: none  Implants:   Implant Name Type Inv.  Item Serial No.  Lot No. LRB No. Used Action   GRAFT BNE PUTTY BIOACTV 15GM -- SIGNAFUSE - [id number]  GRAFT BNE PUTTY BIOACTV 15GM -- [address] [id number] N/A 1 Implanted   [employer] PLUS    [id number]  [id number] N/A 1 Implanted   GRAFT BNE BIOACTV INTERFC 1GM --  - [id number]  GRAFT BNE BIOACTV INTERFC 1GM --   [employer] [id number] N/A 1 Implanted   GRAFT BNE BIOACTV INTERFC 1GM --  - [id number]  GRAFT BNE BIOACTV INTERFC 1GM --   [id number] [id number] N/A 1 Implanted   GRAFT CLLGN SCAFFOLD 10H44KG -- 2/PK [employer] - [id number]   GRAFT CLLGN SCAFFOLD 01N14YX -- 2/PK OSTEOMATRIX   [employer] [id number] N/A 1 Implanted

## 2017-08-29 NOTE — IP AVS SNAPSHOT
Taye Zayas 
 
 
 [address] [place] [address] [address] Patient: Gianfranco Savage MRN: [id number] You are allergic to the following Allergen Reactions Augmentin (Amoxicillin-Pot Clavulanate) Nausea and Vomiting Recent Documentation Height Weight BMI OB Status Smoking Status 1.6 m 78 kg 30.47 kg/m2 Postmenopausal Current Every Day Smoker Emergency Contacts Name Discharge Info Relation Home Work Mobile [address] CAREGIVER [3] Sister [23] [phone] About your hospitalization You were admitted on:  August 29, 2017 You last received care in the:  [facility] 2E GEN SURG You were discharged on:  September 5, 2017 Unit phone number:  [phone] Why you were hospitalized Your primary diagnosis was:  Lumbar Scoliosis Your diagnoses also included:  Lumbar Stenosis, Spondylolisthesis At L4-L5 Level Providers Seen During Your Hospitalizations Provider Role Specialty Primary office phone Sim Eden MD Attending Provider Neurosurgery [phone] Your Primary Care Physician (PCP) Primary Care Physician Office Phone Office Fax [place], [address] [facility] [phone] Follow-up Information Follow up With Details Comments Contact Info Elena Chen MD   [place] [address] 
[phone] Sim Eden MD In 1 month for follow up, please call to make appt. [address] [facility] [address] [place] [address] [phone] Current Discharge Medication List  
  
START taking these medications Dose & Instructions Dispensing Information Comments Morning Noon Evening Bedtime  
 bisacodyl 5 mg EC tablet Commonly known as:  DULCOLAX Your last dose was: Your next dose is:    
   
   
 Dose:  5 mg Take 1 Tab by mouth daily. Quantity:  30 Tab Refills:  1  
     
   
   
   
  
 fexofenadine-pseudoephedrine 180-240 mg per tablet Commonly known as:  ALLEGRA-D 24 Your last dose was: Your next dose is:    
   
   
 Dose:  1 Tab Take 1 Tab by mouth daily as needed. Indications: ALLERGIC RHINITIS Quantity:  30 Tab Refills:  none  
     
   
   
   
  
 methocarbamol 750 mg tablet Commonly known as:  ROBAXIN Your last dose was: Your next dose is:    
   
   
 Dose:  750 mg Take 1 Tab by mouth four (4) times daily as needed. Indications: MUSCLE SPASM Quantity:  40 Tab Refills:  none  
     
   
   
   
  
 oxyCODONE-acetaminophen 5-325 mg per tablet Commonly known as:  PERCOCET Your last dose was: Your next dose is:    
   
   
 Dose:  1 Tab Take 1 Tab by mouth every four (4) hours as needed for up to 7 days. Max Daily Amount: 6 Tabs. Indications: Pain Quantity:  40 Tab Refills:  none CONTINUE these medications which have NOT CHANGED Dose & Instructions Dispensing Information Comments Morning Noon Evening Bedtime ENBREL SURECLICK 50 mg/mL (3.69 mL) injection Generic drug:  etanercept Your last dose was: Your next dose is:    
   
   
 Dose:  50 mg  
50 mg by Injection route every seven (7) days. Refills:  0 FLUoxetine 20 mg capsule Commonly known as:  PROzac Your last dose was: Your next dose is:    
   
   
 Dose:  1 Cap Take 1 Cap by mouth daily. Refills:  0  
     
   
   
   
  
 lisinopril 20 mg tablet Commonly known as:  Apryl Flight Your last dose was: Your next dose is:    
   
   
 Dose:  1 Tab Take 1 Tab by mouth daily. Refills:  0 Where to Get Your Medications Information on where to get these meds will be given to you by the nurse or doctor. ! Ask your nurse or doctor about these medications bisacodyl 5 mg EC tablet  
 fexofenadine-pseudoephedrine 180-240 mg per tablet  
 methocarbamol 750 mg tablet  
 oxyCODONE-acetaminophen 5-325 mg per tablet Discharge Instructions DISCHARGE SUMMARY from Nurse The following personal items are in your possession at time of discharge: 
 
Dental Appliances: None Visual Aid: With patient, Glasses Home Medications: None Jewelry: None Clothing:  (everything kept in pt room, ) Other Valuables: Brace, Eyeglasses PATIENT INSTRUCTIONS: 
 
After general anesthesia or intravenous sedation, for 24 hours or while taking prescription Narcotics: · Limit your activities · Do not drive and operate hazardous machinery · Do not make important personal or business decisions · Do  not drink alcoholic beverages · If you have not urinated within 8 hours after discharge, please contact your surgeon on call. Report the following to your surgeon: 
· Excessive pain, swelling, redness or odor of or around the surgical area · Temperature over 100.5 · Nausea and vomiting lasting longer than 4 hours or if unable to take medications · Any signs of decreased circulation or nerve impairment to extremity: change in color, persistent  numbness, tingling, coldness or increase pain · Any questions What to do at Home: 
Recommended activity: No bending, lifting, twisting or driving until cleared by surgeon. May shower, but do not submerge wounds in water. Do not take NSAIDS ( aleve, aspirin, ibuprofen, etc.) until ok with Dr. Tuyet Ott. Wear brace when out of bed. If you experience any of the following symptoms severe pain, nausea and vomiting, fever above 100.5, bleeding or drainage from incision, shortness of breath, please follow up with Dr. Tuyet Ott. *  Please give a list of your current medications to your Primary Care Provider.  
 
*  Please update this list whenever your medications are discontinued, doses are 
 changed, or new medications (including over-the-counter products) are added. *  Please carry medication information at all times in case of emergency situations. These are general instructions for a healthy lifestyle: No smoking/ No tobacco products/ Avoid exposure to second hand smoke Surgeon General's Warning:  Quitting smoking now greatly reduces serious risk to your health. Obesity, smoking, and sedentary lifestyle greatly increases your risk for illness A healthy diet, regular physical exercise & weight monitoring are important for maintaining a healthy lifestyle You may be retaining fluid if you have a history of heart failure or if you experience any of the following symptoms:  Weight gain of 3 pounds or more overnight or 5 pounds in a week, increased swelling in our hands or feet or shortness of breath while lying flat in bed. Please call your doctor as soon as you notice any of these symptoms; do not wait until your next office visit. Recognize signs and symptoms of STROKE: 
 
F-face looks uneven A-arms unable to move or move unevenly S-speech slurred or non-existent T-time-call 911 as soon as signs and symptoms begin-DO NOT go Back to bed or wait to see if you get better-TIME IS BRAIN. Warning Signs of HEART ATTACK Call 911 if you have these symptoms: 
? Chest discomfort. Most heart attacks involve discomfort in the center of the chest that lasts more than a few minutes, or that goes away and comes back. It can feel like uncomfortable pressure, squeezing, fullness, or pain. ? Discomfort in other areas of the upper body. Symptoms can include pain or discomfort in one or both arms, the back, neck, jaw, or stomach. ? Shortness of breath with or without chest discomfort. ? Other signs may include breaking out in a cold sweat, nausea, or lightheadedness. Don't wait more than five minutes to call [address]!  Fast action can save your life. Calling 911 is almost always the fastest way to get lifesaving treatment. Emergency Medical Services staff can begin treatment when they arrive  up to an hour sooner than if someone gets to the hospital by car. The discharge information has been reviewed with the {PATIENT PARENT GUARDIAN:30797}. The {PATIENT PARENT GUARDIAN:42669} verbalized understanding. Discharge medications reviewed with the {Dishcarge meds reviewed EEPB:02436} and appropriate educational materials and side effects teaching were provided. Discharge Instructions Attachments/References LUMBAR SPINAL FUSION: POST-OP (ENGLISH) Discharge Orders None [employer] Announcement We are excited to announce that we are making your provider's discharge notes available to you in [employer]. You will see these notes when they are completed and signed by the physician that discharged you from your recent hospital stay. If you have any questions or concerns about any information you see in [employer], please call the Health Information Department where you were seen or reach out to your Primary Care Provider for more information about your plan of care. Introducing [facility]! Awilda Singh introduces [employer] patient portal. Now you can access parts of your medical record, email your doctor's office, and request medication refills online. 1. In your internet browser, go to [email or web address] 2. Click on the First Time User? Click Here link in the Sign In box. You will see the New Member Sign Up page. 3. Enter your [employer] Access Code exactly as it appears below. You will not need to use this code after youve completed the sign-up process. If you do not sign up before the expiration date, you must request a new code. · [employer] Access Code: [email or web address] Expires: 11/21/2017 10:58 AM 
 
4.  Enter the last four digits of your Social Security Number (xxxx) and Date of Birth (mm/dd/yyyy) as indicated and click Submit. You will be taken to the next sign-up page. 5. Create a [employer] ID. This will be your [employer] login ID and cannot be changed, so think of one that is secure and easy to remember. 6. Create a [employer] password. You can change your password at any time. 7. Enter your Password Reset Question and Answer. This can be used at a later time if you forget your password. 8. Enter your e-mail address. You will receive e-mail notification when new information is available in [address] 9. Click Sign Up. You can now view and download portions of your medical record. 10. Click the Download Summary menu link to download a portable copy of your medical information. If you have questions, please visit the Frequently Asked Questions section of the [employer] website. Remember, [employer] is NOT to be used for urgent needs. For medical emergencies, dial 911. Now available from your iPhone and Android! General Information Please provide this summary of care documentation to your next provider. Patient Signature:  ____________________________________________________________ Date:  ____________________________________________________________  
  
[facility] Provider Signature:  ____________________________________________________________ Date:  ____________________________________________________________ More Information Lumbar Spinal Fusion: What to Expect at Home Your Recovery After surgery, you can expect your back to feel stiff and sore. You may have trouble sitting or standing in one position for very long and may need pain medicine in the weeks after your surgery. It may take 4 to 6 weeks to get back to doing simple activities, such as light housework. It may take 6 months to a year for your back to get better completely. You may need to wear a back brace while your back heals.  And your doctor may have you go to physical therapy. If your job doesn't require physical labor, you will probably be able to go back to work after 1 or 2 months. If your job involves light physical labor, it may take 3 to 6 months. Most people whose jobs involved heavy labor can never return to those jobs. This care sheet gives you a general idea about how long it will take for you to recover. But each person recovers at a different pace. Follow the steps below to get better as quickly as possible. How can you care for yourself at home? Activity · Rest when you feel tired. Getting enough sleep will help you recover. · Try to walk each day. Start by walking a little more than you did the day before. Bit by bit, increase the amount you walk. Walking boosts blood flow and helps prevent pneumonia and constipation. Walking may also decrease your muscle soreness after surgery. · If advised by your doctor, you may need to avoid lifting anything that would cause excessive strain on your back. This may include a child, heavy grocery bags and milk containers, a heavy briefcase or backpack, cat litter or dog food bags, or a vacuum . · Avoid strenuous activities, such as bicycle riding, jogging, weight lifting, or aerobic exercise, until your doctor says it is okay. · Do not drive for 2 to 4 weeks after your surgery or until your doctor says it is okay. · Avoid riding in a car for more than 30 minutes at a time for 2 to 4 weeks after surgery. If you must ride in a car for a longer distance, stop often to walk and stretch your legs. · Try to change your position about every 30 minutes while sitting or standing. This will help decrease your back pain while you are healing. · You will probably need to take at least 4 to 6 weeks off from work. It depends on the type of work you do and how you feel. · You may have sex as soon as you feel able, but avoid positions that put stress on your back or cause pain. Diet · You can eat your normal diet. If your stomach is upset, try bland, low-fat foods like plain rice, broiled chicken, toast, and yogurt. · Drink plenty of fluids (unless your doctor tells you not to). · You may notice that your bowel movements are not regular right after your surgery. This is common. Try to avoid constipation and straining with bowel movements. You may want to take a fiber supplement every day. If you have not had a bowel movement after a couple of days, ask your doctor about taking a mild laxative. Medicines · Be safe with medicines. Take pain medicines exactly as directed. ¨ If the doctor gave you a prescription medicine for pain, take it as prescribed. ¨ If you are not taking a prescription pain medicine, ask your doctor if you can take an over-the-counter medicine. · If your doctor prescribed antibiotics, take them as directed. Do not stop taking them just because you feel better. You need to take the full course of antibiotics. · If you think your pain medicine is making you sick to your stomach: 
¨ Take your medicine after meals (unless your doctor has told you not to). ¨ Ask your doctor for a different pain medicine. Incision care · You will be given specific instructions about how to care for the cuts (incisions) the doctor made. The instructions will depend on the type of materials used to close the cut. Exercise · Do back exercises as instructed by your doctor. · Your doctor may advise you to work with a physical therapist to improve the strength and flexibility of your back. Other instructions · To reduce stiffness and help sore muscles, use a warm water bottle, a heating pad set on low, or a warm cloth on your back. Do not put heat right over the incision. Do not go to sleep with a heating pad on your skin. Follow-up care is a key part of your treatment and safety.  Be sure to make and go to all appointments, and call your doctor if you are having problems. It's also a good idea to know your test results and keep a list of the medicines you take. When should you call for help? Call 911 anytime you think you may need emergency care. For example, call if: 
· You passed out (lost consciousness). · You have sudden chest pain and shortness of breath, or you cough up blood. · You are unable to move a leg at all. Call your doctor now or seek immediate medical care if: 
· You have pain that does not get better after you take pain pills. · You have new or worse symptoms in your legs or buttocks. Symptoms may include: ¨ Numbness or tingling. ¨ Weakness. ¨ Pain. · You lose bladder or bowel control. · You have loose stitches, or your incision comes open. · You have blood or fluid draining from the incision. · You have signs of infection, such as: 
¨ Increased pain, swelling, warmth, or redness. ¨ Pus draining from the incision. ¨ A fever. Watch closely for any changes in your health, and be sure to contact your doctor if: 
· You do not have a bowel movement after taking a laxative. · You are not getting better as expected. Where can you learn more? Go to http://destiny-shahrzad.info/. Enter N207 in the search box to learn more about \"Lumbar Spinal Fusion: What to Expect at Home. \" Current as of: March 21, 2017 Content Version: 11.3 © 2202-3205 [employer]. Care instructions adapted under license by [employer] (which disclaims liability or warranty for this information). If you have questions about a medical condition or this instruction, always ask your healthcare professional. [address] any warranty or liability for your use of this information.

## 2017-08-29 NOTE — H&P
H&P UPDATE  I have seen this patient and reviewed the H&P and there are no pertinent changes. I have discussed the potential benefits, alternatives, and risks (including but not limited to bleeding requiring transfusion the the attendant risks of HIV/hepatitis/transfusion reaction, infection, spinal fluid leak,   Femoral nerve injury, proximal junctional kyphosis with the need to extend the fusion, non-union/pseudoarthrosis or failure of the bone to heal, hardware malposition with the need for repositioning and further surgery, damage to nerve roots, femoral nerves injury, paraplegia or paralysis, bowel/bladder dysfunction, sexual dysfunction, damage to internal organs/blood vessels with the need for immediate operative intervention, failure to fuse, failure of instrumention, worsened pain/failure to cure, pneumothorax with the need for chest tube placement, wound healing issues, stroke, and death). I have explained that the operative plan could change based on the intraoperative findings. I have explained, using karine and simple terms, that deformity procedures have significantly higher complication rates than most other spine procedures. I have also explained that further surgery may be needed. I have used layman's terms to help enhance understanding. Mrs. Harriett Xiong has displayed a good understanding of what was said and wishes to proceed with surgery.

## 2017-08-29 NOTE — PROGRESS NOTES
TRANSFER - IN REPORT:    Verbal report received from Deandra Garvey Rn(name) on [facility]  being received from PACU(unit) for routine post - op      Report consisted of patients Situation, Background, Assessment and   Recommendations(SBAR). Information from the following report(s) SBAR, Kardex and MAR was reviewed with the receiving nurse. Opportunity for questions and clarification was provided. Assessment completed upon patients arrival to unit and care assumed.

## 2017-08-30 ENCOUNTER — APPOINTMENT (OUTPATIENT)
Dept: GENERAL RADIOLOGY | Age: 67
DRG: 454 | End: 2017-08-30
Attending: NEUROLOGICAL SURGERY
Payer: MEDICARE

## 2017-08-30 ENCOUNTER — ANESTHESIA EVENT (OUTPATIENT)
Dept: SURGERY | Age: 67
DRG: 454 | End: 2017-08-30
Payer: MEDICARE

## 2017-08-30 PROCEDURE — 74011250637 HC RX REV CODE- 250/637: Performed by: NURSE PRACTITIONER

## 2017-08-30 PROCEDURE — 74011250637 HC RX REV CODE- 250/637: Performed by: NEUROLOGICAL SURGERY

## 2017-08-30 PROCEDURE — 74011000258 HC RX REV CODE- 258: Performed by: NEUROLOGICAL SURGERY

## 2017-08-30 PROCEDURE — 74011250636 HC RX REV CODE- 250/636: Performed by: NEUROLOGICAL SURGERY

## 2017-08-30 PROCEDURE — 65270000029 HC RM PRIVATE

## 2017-08-30 PROCEDURE — 74011250637 HC RX REV CODE- 250/637: Performed by: INTERNAL MEDICINE

## 2017-08-30 RX ORDER — SCOLOPAMINE TRANSDERMAL SYSTEM 1 MG/1
1.5 PATCH, EXTENDED RELEASE TRANSDERMAL ONCE
Status: COMPLETED | OUTPATIENT
Start: 2017-08-31 | End: 2017-09-03

## 2017-08-30 RX ORDER — DOCUSATE SODIUM 100 MG/1
100 CAPSULE, LIQUID FILLED ORAL 2 TIMES DAILY
Status: DISCONTINUED | OUTPATIENT
Start: 2017-08-30 | End: 2017-09-05 | Stop reason: HOSPADM

## 2017-08-30 RX ORDER — DIPHENHYDRAMINE HCL 25 MG
25 CAPSULE ORAL
Status: DISCONTINUED | OUTPATIENT
Start: 2017-08-30 | End: 2017-08-31

## 2017-08-30 RX ORDER — DIPHENHYDRAMINE HCL 25 MG
25 CAPSULE ORAL
Status: DISCONTINUED | OUTPATIENT
Start: 2017-08-30 | End: 2017-08-30

## 2017-08-30 RX ADMIN — HYDROMORPHONE HYDROCHLORIDE 1 MG: 1 INJECTION, SOLUTION INTRAMUSCULAR; INTRAVENOUS; SUBCUTANEOUS at 08:39

## 2017-08-30 RX ADMIN — LISINOPRIL 20 MG: 20 TABLET ORAL at 08:39

## 2017-08-30 RX ADMIN — DOCUSATE SODIUM 100 MG: 100 CAPSULE, LIQUID FILLED ORAL at 17:43

## 2017-08-30 RX ADMIN — OXYCODONE HYDROCHLORIDE AND ACETAMINOPHEN 1 TABLET: 5; 325 TABLET ORAL at 17:43

## 2017-08-30 RX ADMIN — DIPHENHYDRAMINE HYDROCHLORIDE 25 MG: 25 CAPSULE ORAL at 15:19

## 2017-08-30 RX ADMIN — LORAZEPAM 1 MG: 1 TABLET ORAL at 08:39

## 2017-08-30 RX ADMIN — OXYCODONE HYDROCHLORIDE AND ACETAMINOPHEN 1 TABLET: 5; 325 TABLET ORAL at 06:55

## 2017-08-30 RX ADMIN — DEXTROSE MONOHYDRATE, SODIUM CHLORIDE, AND POTASSIUM CHLORIDE 75 ML/HR: 50; 4.5; 1.49 INJECTION, SOLUTION INTRAVENOUS at 06:56

## 2017-08-30 RX ADMIN — BISACODYL 5 MG: 5 TABLET, COATED ORAL at 02:54

## 2017-08-30 RX ADMIN — FLUOXETINE 20 MG: 20 CAPSULE ORAL at 08:39

## 2017-08-30 RX ADMIN — OXYCODONE HYDROCHLORIDE AND ACETAMINOPHEN 1 TABLET: 5; 325 TABLET ORAL at 02:54

## 2017-08-30 RX ADMIN — HYDROMORPHONE HYDROCHLORIDE 1 MG: 1 INJECTION, SOLUTION INTRAMUSCULAR; INTRAVENOUS; SUBCUTANEOUS at 00:46

## 2017-08-30 RX ADMIN — DEXTROSE MONOHYDRATE, SODIUM CHLORIDE, AND POTASSIUM CHLORIDE 75 ML/HR: 50; 4.5; 1.49 INJECTION, SOLUTION INTRAVENOUS at 21:28

## 2017-08-30 RX ADMIN — OXYCODONE HYDROCHLORIDE AND ACETAMINOPHEN 1 TABLET: 5; 325 TABLET ORAL at 12:41

## 2017-08-30 RX ADMIN — LORAZEPAM 1 MG: 1 TABLET ORAL at 00:46

## 2017-08-30 RX ADMIN — OXYCODONE HYDROCHLORIDE AND ACETAMINOPHEN 1 TABLET: 5; 325 TABLET ORAL at 22:42

## 2017-08-30 RX ADMIN — HYDROMORPHONE HYDROCHLORIDE 1 MG: 1 INJECTION, SOLUTION INTRAMUSCULAR; INTRAVENOUS; SUBCUTANEOUS at 14:33

## 2017-08-30 RX ADMIN — CEFAZOLIN SODIUM 1 G: 1 INJECTION, POWDER, FOR SOLUTION INTRAMUSCULAR; INTRAVENOUS at 08:37

## 2017-08-30 RX ADMIN — DOCUSATE SODIUM 100 MG: 100 CAPSULE, LIQUID FILLED ORAL at 22:41

## 2017-08-30 NOTE — ANESTHESIA PREPROCEDURE EVALUATION
Anesthetic History   No history of anesthetic complications            Review of Systems / Medical History  Patient summary reviewed and pertinent labs reviewed    Pulmonary  Within defined limits                 Neuro/Psych   Within defined limits           Cardiovascular    Hypertension              Exercise tolerance: >4 METS     GI/Hepatic/Renal  Within defined limits              Endo/Other        Obesity and arthritis     Other Findings   Comments:   Risk Factors for Postoperative nausea/vomiting:       History of postoperative nausea/vomiting? NO       Female? YES       Motion sickness? YES       Intended opioid administration for postoperative analgesia? YES      Smoking Abstinence  Current Smoker? YES  Elective Surgery? YES  Seen preoperatively by anesthesiologist or proxy prior to day of surgery? YES  Pt abstained from smoking 24 hours prior to anesthesia?  YES         Physical Exam    Airway  Mallampati: II  TM Distance: 4 - 6 cm  Neck ROM: normal range of motion   Mouth opening: Normal     Cardiovascular  Regular rate and rhythm,  S1 and S2 normal,  no murmur, click, rub, or gallop  Rhythm: regular  Rate: normal         Dental  No notable dental hx       Pulmonary  Breath sounds clear to auscultation               Abdominal  GI exam deferred       Other Findings            Anesthetic Plan    ASA: 2  Anesthesia type: general          Induction: Intravenous  Anesthetic plan and risks discussed with: Patient and Sibling

## 2017-08-30 NOTE — PROGRESS NOTES
Received report from Nilson Davila RN. Pt resting in bed, white board updated, call bell within reach. [phone] Paged Dr. Becerra. Pt is adamantly requesting Colace BID PO, as recommended by the anestheologist.     1945 Received telephone orders with read back for colace 100mg BID PO and to make pt NPO after midnight. Bedside shift change report given to Paul Russ RN (oncoming nurse) by Rohit Rogers RN (offgoing nurse). Report included the following information SBAR, Kardex, OR Summary, Intake/Output, MAR and Recent Results.

## 2017-08-30 NOTE — PROGRESS NOTES

## 2017-08-30 NOTE — PROGRESS NOTES
[facility]/[address]   Discharge Planning/ Assessment      Reasons for Intervention:   Interviewed patient, agrees to share her discharge information with her sister , see below. Patient was independent with the use of a cane, she also has a walker prior to  admission. She sees Dr Nunu Givens for her primary care needs. Reviewed all demographic information with the patient. The discharge plan is to go to rehab at [facility]. Placed in e discharge and matched to Sandra, patient states she has spoken to Sofía Latham [phone] and Avani Morrison [phone]. Patient says she has provided them both with her insurance information. This is the only facility the patient has provided for rehab.      High Risk Criteria  [] Yes                [x]No   Physician Referral  [] Yes                [x]No        Date      Nursing Referral  [] Yes                [x]No        Date      Patient/Family Request  [] Yes                [x]No        Date          Resources:      Medicare  [x] Yes                []No   Medicaid  [] Yes                [x]No   No Resources  [] Yes                [x]No   Private Insurance  [x] Yes  AARP              []No     Name/Phone Number      Other  [] Yes                [x]No        (i.e. Workman's Comp)          Prior Services:      Prior Services [address] [facility]  [] Yes                [x]No        Number of [address] [employer] [facility]  [] Yes                [x]No         Meals on [address] on [address] [facility]  [] Yes                [x]No   AVINASH Rios [address] [facility]  [] Yes                [x]No        Rehab/VA Name      Other [address] obtained from  [x] Patient  [] Parent   [] Guardian  [] Child  [] Spouse   [] Significant Other/Partner   [] Friend      [] EMS    [] Nursing Home Chart          [x] Other:chart   Chart Review  [x] Yes                []No       Family/Support System:      Patient lives with  [] Alone    [] Spouse   [] Significant Other  [] Children  [] Caretaker   [] Parent  [] Sibling     [] Other        Other Support System:      Is the patient responsible for care of others  [] Yes                [x]No   Information of person caring for patient on  discharge self   Managers financial affairs independently  [] Yes                []No   If no, explain:          Status Prior to Admission:      Mental Status  [x] Awake  [x] Alert  [x] Oriented  [] Quiet/Calm [] Lethargic/Sedated   [] Disoriented  [] Restless/Anxious  [] Combative   [address]   Meal Preparation Ability  [x] Independent   [] Standby Assistance [address] [facility] Resident   [] Requires Assistance   Bowel/Bladder  [x] Continent  [] Catheter  [] Incontinent  [] Ostomy Self-Care    [] Urine Diversion Self-Care  [] Maximum Assistance     [] Total Assistance   Number of Persons needed for assistance      DME at home  [] Cane, Quad  [x] Cane, Straight   [] Commode    [] Bathroom/Grab Bars  [] Hospital Bed  [] Nebulizer  [] Oxygen           [] Raised Toilet Seat  [] Shower Chair  [] Side Rails for Bed   [] Tub Transfer Bench   [x] Walker, Rolling  [] Walker, Standard      [] Other:   Vendor          Treatment Presently Receiving:      Current Treatments  [] Chemotherapy  [] Dialysis  [] Insulin  [] IVAB [x] IVF   [] O2  [] PCA   [] PT   [] RT   [] Tube Feedings   [] Wound Care       Psychosocial Evaluation:      Verbalized Knowledge of Disease Process  [x] Patient                       [x]Family   Coping with Disease Process  [x] Patient                       [x]Family   Requires Further Counseling Coping with Disease Process  [] Patient                       []Family       Identified Projected Needs:  [address]  [] Yes                [x]No   Transportation  [] Yes                [x]No   Education  [] Yes                [x]No        Specific Education       Financial Counseling  [] Yes                [x]No   Inability to Care for Self/Will Require 24 hour care  [] Yes                [x]No   Pain Management  [] Yes                [x]No   Home Infusion Therapy  [] Yes                [x]No   Oxygen Therapy  [] Yes                [x]No   DME  [] Yes                [x]No   [address] Placement  [] Yes                [x]No   Rehab  [x] Yes                []No   Physical Therapy  [x] Yes                []No   Needs Anticipated At This Time  [x] Yes                []No       Intra-Hospital Referral:  [address]  [] Yes                [x]No     [] Yes                [x]No   Patient Representative  [] Yes                [x]No   Staff for Teaching Needs  [] Yes                [x]No   Specialty Teaching Needs      Diabetic Educator  [] Yes                [x]No   Referral for Diabetic Educator Needed  [] Yes                [x]No  If Yes, place order for Nutritionist or Diabetic Consult       Tentative Discharge Plan:  [facility] with No Services  [x] Yes                []No   Home with [address] Follow-up  [] Yes                [x]No        If Yes, specify type [address]  [] Yes                [x]No        If Yes, specify type      Meals on Wheels  [] Yes                [x]No   Office of Aging  [] Yes                [x]No   NHP  [] Yes                [x]No   Return to the Nursing Home  [] Yes                [x]No   Rehab Therapy  [] Yes                [x]No   Acute Rehab  [] Yes                [x]No   Subacute Rehab  [x] Yes                []No   Private Care  [] Yes                [x]No   Substance Abuse Referral  [x] Yes                []No   Transportation  [] Yes                [x]No   Chore Service  [] Yes                [x]No   Inpatient Hospice  [] Yes                [x]No   OP RT  [] Yes                [x] No   OP Hemo  [] Yes                [x] No   OP PT  [] Yes                [x]No   Support Group  [] Yes                [x]No   Reach to Recovery  [] Yes                [x]No   [address]  [] Yes                [x]No   Clinic Appointment  [] Yes                [x]No   DME  [] Yes                [x]No   Comments      Name of D/C Planner or  Given to Patient or Family Sheldon Garcia RN   Phone Number [phone] [address]   Date Aug 29, 2017   Time    If you are discharged home, whom do you designate to participate in your discharge plan and receive any information needed?       Enter name of Jesusita Gusman  sister        Phone # of dinora         Address of [employer] [place], [place] [address]        Updated Aug 29, 2017        Patient refused to designate any           individual

## 2017-08-30 NOTE — PROGRESS NOTES
Patient has designated her sister to participate in his/her discharge plan and to receive any needed information.      Name:   Mina Navarro  sister   [phone] [address] [phone]   Aug 20, 2017   Address:  Phone number:

## 2017-08-30 NOTE — PROGRESS NOTES
4378 Received bedside report from [facility] DR FARHAD HARRELL, RN, updated white board, call bell is within reach. 0800 Patient in the bed eating breakfast at this time. Call bell is within reach. 0845 Spa team in the room at this time. Patient is getting cleaned up at this time. 0930 Patient is sitting up in the chair at this time. Sister at the bedside. Patient is requesting to get back into bed at this time. Encouraged her to walk at this time per Dr. Preet Vega orders. Patient was able to ambulated down the hallway and back to the bed at this time. Family member is at the bedside. Call bell is within reach. 1200 Patient sitting in the bed at this time eating her lunch. Family member is at the bedside. Call bell is within reach. 5 Assisted the patient to the bathroom at this time and back to bed. Call bell is within reach. Family member at the bedside. 1430 Patient requesting pain medication at this time. 1505 Patient informed the Dr. Bernardo Garcia that she is now itching at this time. 1520 Patient given benadryl for her itching this time. Explained to the patient that she needs to be mindful about the itching and receiving IV pain medication as that could be the reason for her itching. Call bell is within reach. Family member is at the bedside.

## 2017-08-30 NOTE — PROGRESS NOTES
Bedside and Verbal shift change report given to Magdalene Giron (oncoming nurse) by Sally Lakhani RN (offgoing nurse). Report included the following information SBAR, Kardex and MAR.

## 2017-08-30 NOTE — PROGRESS NOTES
Daily Progress Note: 2017 1:34 PM   Admit Date: 2017    Patient seen in follow up for multiple medical problems as listed below:  Patient Active Problem List   Diagnosis Code    Lumbar scoliosis M41.9       Assesment     -S/p L2-L3 anterolateral/retroperitoneal interbody fusion, left iliac crest needle aspiration, planned further surgery  diet and mobilization, pain control per NS Team   - HTN: Lisinopril   - Depression: Fluoxetine   - ETOH abuse: CIWA protocol   - Tobacco abuse: Nicoderm      DVT Protocol Active:SCDs  Code Status:  Full Code     Disposition:pending further surgery    Subjective:     CC: No chief complaint on file. Interval History: much improved today. Now c/o some pain from ovaries in R side of pelvis which is a chronic issue    ROS: 11 point ROS negative except for    Objective:     Visit Vitals    /73 (BP 1 Location: Left arm, BP Patient Position: At rest)    Pulse 85    Temp 97.7 °F (36.5 °C)    Resp 16    Ht 5' 3\" (1.6 m)    Wt 78.1 kg (172 lb 2 oz)    SpO2 95%    BMI 30.49 kg/m2       Temp (24hrs), Av.8 °F (36.6 °C), Min:97.6 °F (36.4 °C), Max:98.1 °F (36.7 °C)        Intake/Output Summary (Last 24 hours) at 17 1334  Last data filed at 17 1118   Gross per 24 hour   Intake           1077.5 ml   Output             2600 ml   Net          -1522.5 ml       Gen: AOx3, NAD  HEENT:  CHRIS, EOMI. Neck: No Bruits/JVD   Lungs:   CTAB. Good respiratory effort  Heart:   RR S1 S2 without M/R/G  Abdomen: ND,NT, BSX4,   Extremities:   No LE edema. No cyanosis. Skin:  no jaundice/lesions      Data Review:     Meds/Labs/Tests reviewed    Current Shift:  701 -  1900  In: 360 [P.O.:360]  Out: 1100 [Urine:1100]  Last three shifts:  1901 -  0700  In: 2317.5 [P.O.:360; I.V.:1957.5]  Out: 1525 [Urine:1500]  No results for input(s): WBC, RBC, HGB, HCT, PLT, GRANS, LYMPH, EOS, HGBEXT, HCTEXT, PLTEXT in the last 72 hours.     No results for input(s): BUN, CREA, CA, ALB, K, NA, CL, CO2, PHOS, GLU in the last 72 hours.      Lab Results   Component Value Date/Time    Glucose 86 08/23/2017 11:11 AM          Care coordination with Nursing/Consultants/staff: 5  Prior history, labs, and charting reviewed: 10    Procedures/Imaging:  No new    Total time spent with chart review, patient examination/education, discussion with staff on case,documentation and medication management / adjustment  :  30 Minutes      Dr Wynne [facility] [place]  [phone]

## 2017-08-30 NOTE — ROUTINE PROCESS
Bedside and Verbal shift change report given to TORRI Whyte (oncoming nurse) by Remy Hutson RN (offgoing nurse). Report included the following information SBAR, Kardex and MAR. Patient had no acute events overnight. Currently resting in NAD. No express needs reported at this time.

## 2017-08-30 NOTE — PROGRESS NOTES
Problem: Falls - Risk of  Goal: *Absence of Falls  Document Jono Fall Risk and appropriate interventions in the flowsheet.    Outcome: Progressing Towards Goal  Fall Risk Interventions:  Mobility Interventions: Patient to call before getting OOB, PT Consult for mobility concerns, PT Consult for assist device competence           Medication Interventions: Patient to call before getting OOB, Teach patient to arise slowly     Elimination Interventions: Call light in reach, Patient to call for help with toileting needs, Toileting schedule/hourly rounds

## 2017-08-31 ENCOUNTER — ANESTHESIA (OUTPATIENT)
Dept: SURGERY | Age: 67
DRG: 454 | End: 2017-08-31
Payer: MEDICARE

## 2017-08-31 ENCOUNTER — APPOINTMENT (OUTPATIENT)
Dept: GENERAL RADIOLOGY | Age: 67
DRG: 454 | End: 2017-08-31
Attending: NEUROLOGICAL SURGERY
Payer: MEDICARE

## 2017-08-31 PROBLEM — M43.16 SPONDYLOLISTHESIS AT L4-L5 LEVEL: Status: ACTIVE | Noted: 2017-08-31

## 2017-08-31 PROBLEM — M48.061 LUMBAR STENOSIS: Status: ACTIVE | Noted: 2017-08-31

## 2017-08-31 PROCEDURE — 77030037730 HC PTTY BN HEMOSRB ABRY -C: Performed by: NEUROLOGICAL SURGERY

## 2017-08-31 PROCEDURE — 77030013079 HC BLNKT BAIR HGGR 3M -A: Performed by: NURSE ANESTHETIST, CERTIFIED REGISTERED

## 2017-08-31 PROCEDURE — 0ST40ZZ RESECTION OF LUMBOSACRAL DISC, OPEN APPROACH: ICD-10-PCS | Performed by: NEUROLOGICAL SURGERY

## 2017-08-31 PROCEDURE — 77030029372 HC ADH SKN CLSR PRINEO J&J -C: Performed by: NEUROLOGICAL SURGERY

## 2017-08-31 PROCEDURE — C1713 ANCHOR/SCREW BN/BN,TIS/BN: HCPCS | Performed by: NEUROLOGICAL SURGERY

## 2017-08-31 PROCEDURE — 77030027703 HC MAXCESS 4 KT DISP NUVA -H: Performed by: NEUROLOGICAL SURGERY

## 2017-08-31 PROCEDURE — 77030021510 HC CLP MNTR NEURO M5 NUVA -E: Performed by: NEUROLOGICAL SURGERY

## 2017-08-31 PROCEDURE — 77030026918 HC ADMN ST IV BLD BD -A: Performed by: NURSE ANESTHETIST, CERTIFIED REGISTERED

## 2017-08-31 PROCEDURE — 77030002933 HC SUT MCRYL J&J -A: Performed by: NEUROLOGICAL SURGERY

## 2017-08-31 PROCEDURE — 77030019908 HC STETH ESOPH SIMS -A: Performed by: NURSE ANESTHETIST, CERTIFIED REGISTERED

## 2017-08-31 PROCEDURE — 77030020271 HC MISC IMPL NEURO: Performed by: NEUROLOGICAL SURGERY

## 2017-08-31 PROCEDURE — 77030031359 HC BLD BN MILL DISP STRY -E: Performed by: NEUROLOGICAL SURGERY

## 2017-08-31 PROCEDURE — 77030012602 HC SPNG PTTY NEUR J&J -B: Performed by: NEUROLOGICAL SURGERY

## 2017-08-31 PROCEDURE — 77030031878 HC NDL SPN ACC SYS I-PAS NUVA -D: Performed by: NEUROLOGICAL SURGERY

## 2017-08-31 PROCEDURE — 77030003029 HC SUT VCRL J&J -B: Performed by: NEUROLOGICAL SURGERY

## 2017-08-31 PROCEDURE — 74011000258 HC RX REV CODE- 258

## 2017-08-31 PROCEDURE — 74011250637 HC RX REV CODE- 250/637: Performed by: NURSE ANESTHETIST, CERTIFIED REGISTERED

## 2017-08-31 PROCEDURE — 74011000258 HC RX REV CODE- 258: Performed by: NEUROLOGICAL SURGERY

## 2017-08-31 PROCEDURE — 65270000029 HC RM PRIVATE

## 2017-08-31 PROCEDURE — 74011250637 HC RX REV CODE- 250/637: Performed by: NEUROLOGICAL SURGERY

## 2017-08-31 PROCEDURE — 74011250636 HC RX REV CODE- 250/636

## 2017-08-31 PROCEDURE — 74011250637 HC RX REV CODE- 250/637: Performed by: ANESTHESIOLOGY

## 2017-08-31 PROCEDURE — 74011000272 HC RX REV CODE- 272: Performed by: NEUROLOGICAL SURGERY

## 2017-08-31 PROCEDURE — 76210000016 HC OR PH I REC 1 TO 1.5 HR: Performed by: NEUROLOGICAL SURGERY

## 2017-08-31 PROCEDURE — 0SH004Z INSERTION OF INTERNAL FIXATION DEVICE INTO LUMBAR VERTEBRAL JOINT, OPEN APPROACH: ICD-10-PCS | Performed by: NEUROLOGICAL SURGERY

## 2017-08-31 PROCEDURE — 74011000250 HC RX REV CODE- 250: Performed by: NEUROLOGICAL SURGERY

## 2017-08-31 PROCEDURE — 77030014647 HC SEAL FBRN TISSL BAXT -D: Performed by: NEUROLOGICAL SURGERY

## 2017-08-31 PROCEDURE — 77030034813 HC PRB BALL TIP M5 SSEP DISP NUVA -D: Performed by: NEUROLOGICAL SURGERY

## 2017-08-31 PROCEDURE — 77030011640 HC PAD GRND REM COVD -A: Performed by: NEUROLOGICAL SURGERY

## 2017-08-31 PROCEDURE — 77030034171 HC GRFT COLGN SCAFLD BSTE -G: Performed by: NEUROLOGICAL SURGERY

## 2017-08-31 PROCEDURE — 77030034169 HC GRFT BN BIOACTV INTRFC 1G BSTEB -F: Performed by: NEUROLOGICAL SURGERY

## 2017-08-31 PROCEDURE — 77030038222 HC BUR MIDSREX MEDT -D: Performed by: NEUROLOGICAL SURGERY

## 2017-08-31 PROCEDURE — 77030031898 HC WRE K DISP NUVA -B: Performed by: NEUROLOGICAL SURGERY

## 2017-08-31 PROCEDURE — 77030020486 HC ELECTRD EMG KT NUVA -G1: Performed by: NEUROLOGICAL SURGERY

## 2017-08-31 PROCEDURE — 77030011267 HC ELECTRD BLD COVD -A: Performed by: NEUROLOGICAL SURGERY

## 2017-08-31 PROCEDURE — 74011250636 HC RX REV CODE- 250/636: Performed by: NURSE ANESTHETIST, CERTIFIED REGISTERED

## 2017-08-31 PROCEDURE — 77030029099 HC BN WAX SSPC -A: Performed by: NEUROLOGICAL SURGERY

## 2017-08-31 PROCEDURE — 74011250637 HC RX REV CODE- 250/637: Performed by: INTERNAL MEDICINE

## 2017-08-31 PROCEDURE — C1729 CATH, DRAINAGE: HCPCS | Performed by: NEUROLOGICAL SURGERY

## 2017-08-31 PROCEDURE — 77030005515 HC CATH URETH FOL14 BARD -B: Performed by: NEUROLOGICAL SURGERY

## 2017-08-31 PROCEDURE — 77030018673: Performed by: NEUROLOGICAL SURGERY

## 2017-08-31 PROCEDURE — 0SG00AJ FUSION OF LUMBAR VERTEBRAL JOINT WITH INTERBODY FUSION DEVICE, POSTERIOR APPROACH, ANTERIOR COLUMN, OPEN APPROACH: ICD-10-PCS | Performed by: NEUROLOGICAL SURGERY

## 2017-08-31 PROCEDURE — 77030030163 HC BN WAX J&J -A: Performed by: NEUROLOGICAL SURGERY

## 2017-08-31 PROCEDURE — 76060000042 HC ANESTHESIA 5.5 TO 6 HR: Performed by: NEUROLOGICAL SURGERY

## 2017-08-31 PROCEDURE — 01NB0ZZ RELEASE LUMBAR NERVE, OPEN APPROACH: ICD-10-PCS | Performed by: NEUROLOGICAL SURGERY

## 2017-08-31 PROCEDURE — 77030004391 HC BUR FLUT MEDT -C: Performed by: NEUROLOGICAL SURGERY

## 2017-08-31 PROCEDURE — 77030008683 HC TU ET CUF COVD -A: Performed by: NURSE ANESTHETIST, CERTIFIED REGISTERED

## 2017-08-31 PROCEDURE — 74011250636 HC RX REV CODE- 250/636: Performed by: NEUROLOGICAL SURGERY

## 2017-08-31 PROCEDURE — 0QH204Z INSERTION OF INTERNAL FIXATION DEVICE INTO RIGHT PELVIC BONE, OPEN APPROACH: ICD-10-PCS | Performed by: NEUROLOGICAL SURGERY

## 2017-08-31 PROCEDURE — 74011000250 HC RX REV CODE- 250

## 2017-08-31 PROCEDURE — 77030003028 HC SUT VCRL J&J -A: Performed by: NEUROLOGICAL SURGERY

## 2017-08-31 PROCEDURE — 0SH304Z INSERTION OF INTERNAL FIXATION DEVICE INTO LUMBOSACRAL JOINT, OPEN APPROACH: ICD-10-PCS | Performed by: NEUROLOGICAL SURGERY

## 2017-08-31 PROCEDURE — 07DS3ZZ EXTRACTION OF VERTEBRAL BONE MARROW, PERCUTANEOUS APPROACH: ICD-10-PCS | Performed by: NEUROLOGICAL SURGERY

## 2017-08-31 PROCEDURE — 77030020268 HC MISC GENERAL SUPPLY: Performed by: NEUROLOGICAL SURGERY

## 2017-08-31 PROCEDURE — 0QH304Z INSERTION OF INTERNAL FIXATION DEVICE INTO LEFT PELVIC BONE, OPEN APPROACH: ICD-10-PCS | Performed by: NEUROLOGICAL SURGERY

## 2017-08-31 PROCEDURE — 77030018836 HC SOL IRR NACL ICUM -A: Performed by: NEUROLOGICAL SURGERY

## 2017-08-31 PROCEDURE — 77030032490 HC SLV COMPR SCD KNE COVD -B: Performed by: NEUROLOGICAL SURGERY

## 2017-08-31 PROCEDURE — 0SG30AJ FUSION OF LUMBOSACRAL JOINT WITH INTERBODY FUSION DEVICE, POSTERIOR APPROACH, ANTERIOR COLUMN, OPEN APPROACH: ICD-10-PCS | Performed by: NEUROLOGICAL SURGERY

## 2017-08-31 PROCEDURE — 77030008477 HC STYL SATN SLP COVD -A: Performed by: NURSE ANESTHETIST, CERTIFIED REGISTERED

## 2017-08-31 PROCEDURE — 76010000178 HC OR TIME 5.5 TO 6 HR INTENSV-TIER 1: Performed by: NEUROLOGICAL SURGERY

## 2017-08-31 PROCEDURE — 77030011266 HC ELECTRD BLD INSL COVD -A: Performed by: NEUROLOGICAL SURGERY

## 2017-08-31 PROCEDURE — 77030033138 HC SUT PGA STRATFX J&J -B: Performed by: NEUROLOGICAL SURGERY

## 2017-08-31 DEVICE — IMPLANTABLE DEVICE: Type: IMPLANTABLE DEVICE | Site: SPINE LUMBAR | Status: FUNCTIONAL

## 2017-08-31 DEVICE — GRAFT BNE SUB 15GM GRN CA COMPND PTTY AND SILICATE BASE INJ: Type: IMPLANTABLE DEVICE | Site: SPINE LUMBAR | Status: FUNCTIONAL

## 2017-08-31 DEVICE — GRAFT BNE SUB 7.5GM PTTY SYN SIGNAFUSE: Type: IMPLANTABLE DEVICE | Site: SPINE LUMBAR | Status: FUNCTIONAL

## 2017-08-31 DEVICE — SCREW SPNL DIA5.5MM OPN TULIP LOK RELINE: Type: IMPLANTABLE DEVICE | Site: SPINE LUMBAR | Status: FUNCTIONAL

## 2017-08-31 DEVICE — INTERFACE IS A SYNTHETIC BIOACTIVE BONE GRAFT FOR USE IN THE REPAIR OF OSSEOUS DEFECTS. IT IS SUPPLIED AS IRREGULAR SYNTHETIC GRANULES OF BIOACTIVE GLASS (45S5 BIOGLASS), SIZED FROM 200 MICRONS TO 420 MICRONS. WHEN IMPLANTED IN LIVING TISSUE, THE MATERIAL UNDERGOES A TIME DEPENDENT SURFACE MODIFICATION. THE SURFACE REACTION RESULTS IN THE FORMATION OF A CALCIUM PHOSPHATE LAYER, WHICH IS EQUIVALENT IN COMPOSITION AND STRUCTURE TO THE HYDROXYAPATITE FOUND IN BONE MINERAL. THE BIOLOGICAL APATITE LAYER OF THE GRANULES PROVIDES AN OSTEOCONDUCTIVE SCAFFOLD FOR THE GENERATION OF NEW OSSEOUS TISSUE. NEW BONE INFILTRATES AROUND THE GRANULES ALLOWING THE REPAIR OF THE DEFECT AS THE GRANULES ARE ABSORBED.
Type: IMPLANTABLE DEVICE | Site: SPINE LUMBAR | Status: FUNCTIONAL
Brand: INTERFACE

## 2017-08-31 RX ORDER — DEXTROSE 50 % IN WATER (D50W) INTRAVENOUS SYRINGE
25-50 AS NEEDED
Status: DISCONTINUED | OUTPATIENT
Start: 2017-08-31 | End: 2017-08-31 | Stop reason: HOSPADM

## 2017-08-31 RX ORDER — INSULIN LISPRO 100 [IU]/ML
INJECTION, SOLUTION INTRAVENOUS; SUBCUTANEOUS ONCE
Status: DISCONTINUED | OUTPATIENT
Start: 2017-08-31 | End: 2017-08-31 | Stop reason: HOSPADM

## 2017-08-31 RX ORDER — HYDROMORPHONE HYDROCHLORIDE 2 MG/ML
0.2 INJECTION, SOLUTION INTRAMUSCULAR; INTRAVENOUS; SUBCUTANEOUS AS NEEDED
Status: DISCONTINUED | OUTPATIENT
Start: 2017-08-31 | End: 2017-08-31 | Stop reason: HOSPADM

## 2017-08-31 RX ORDER — OXYCODONE AND ACETAMINOPHEN 5; 325 MG/1; MG/1
1 TABLET ORAL AS NEEDED
Status: DISCONTINUED | OUTPATIENT
Start: 2017-08-31 | End: 2017-08-31 | Stop reason: HOSPADM

## 2017-08-31 RX ORDER — PROPOFOL 10 MG/ML
INJECTION, EMULSION INTRAVENOUS AS NEEDED
Status: DISCONTINUED | OUTPATIENT
Start: 2017-08-31 | End: 2017-08-31 | Stop reason: HOSPADM

## 2017-08-31 RX ORDER — DIAZEPAM 10 MG/2ML
5 INJECTION INTRAMUSCULAR ONCE
Status: DISCONTINUED | OUTPATIENT
Start: 2017-08-31 | End: 2017-08-31

## 2017-08-31 RX ORDER — FAMOTIDINE 20 MG/1
20 TABLET, FILM COATED ORAL ONCE
Status: COMPLETED | OUTPATIENT
Start: 2017-08-31 | End: 2017-08-31

## 2017-08-31 RX ORDER — ONDANSETRON 2 MG/ML
INJECTION INTRAMUSCULAR; INTRAVENOUS AS NEEDED
Status: DISCONTINUED | OUTPATIENT
Start: 2017-08-31 | End: 2017-08-31 | Stop reason: HOSPADM

## 2017-08-31 RX ORDER — DIPHENHYDRAMINE HYDROCHLORIDE 50 MG/ML
12.5 INJECTION, SOLUTION INTRAMUSCULAR; INTRAVENOUS
Status: DISCONTINUED | OUTPATIENT
Start: 2017-08-31 | End: 2017-08-31 | Stop reason: HOSPADM

## 2017-08-31 RX ORDER — DEXAMETHASONE SODIUM PHOSPHATE 4 MG/ML
INJECTION, SOLUTION INTRA-ARTICULAR; INTRALESIONAL; INTRAMUSCULAR; INTRAVENOUS; SOFT TISSUE AS NEEDED
Status: DISCONTINUED | OUTPATIENT
Start: 2017-08-31 | End: 2017-08-31 | Stop reason: HOSPADM

## 2017-08-31 RX ORDER — MAGNESIUM SULFATE 100 %
4 CRYSTALS MISCELLANEOUS AS NEEDED
Status: DISCONTINUED | OUTPATIENT
Start: 2017-08-31 | End: 2017-08-31 | Stop reason: HOSPADM

## 2017-08-31 RX ORDER — KETAMINE HYDROCHLORIDE 50 MG/ML
INJECTION, SOLUTION INTRAMUSCULAR; INTRAVENOUS AS NEEDED
Status: DISCONTINUED | OUTPATIENT
Start: 2017-08-31 | End: 2017-08-31 | Stop reason: HOSPADM

## 2017-08-31 RX ORDER — SODIUM CHLORIDE 0.9 % (FLUSH) 0.9 %
5-10 SYRINGE (ML) INJECTION AS NEEDED
Status: DISCONTINUED | OUTPATIENT
Start: 2017-08-31 | End: 2017-08-31 | Stop reason: HOSPADM

## 2017-08-31 RX ORDER — SODIUM CHLORIDE 9 MG/ML
INJECTION, SOLUTION INTRAVENOUS
Status: DISCONTINUED | OUTPATIENT
Start: 2017-08-31 | End: 2017-08-31 | Stop reason: HOSPADM

## 2017-08-31 RX ORDER — SODIUM CHLORIDE, SODIUM LACTATE, POTASSIUM CHLORIDE, CALCIUM CHLORIDE 600; 310; 30; 20 MG/100ML; MG/100ML; MG/100ML; MG/100ML
50 INJECTION, SOLUTION INTRAVENOUS CONTINUOUS
Status: DISCONTINUED | OUTPATIENT
Start: 2017-08-31 | End: 2017-08-31 | Stop reason: HOSPADM

## 2017-08-31 RX ORDER — ESMOLOL HYDROCHLORIDE 10 MG/ML
INJECTION INTRAVENOUS AS NEEDED
Status: DISCONTINUED | OUTPATIENT
Start: 2017-08-31 | End: 2017-08-31 | Stop reason: HOSPADM

## 2017-08-31 RX ORDER — FENTANYL CITRATE 50 UG/ML
INJECTION, SOLUTION INTRAMUSCULAR; INTRAVENOUS AS NEEDED
Status: DISCONTINUED | OUTPATIENT
Start: 2017-08-31 | End: 2017-08-31 | Stop reason: HOSPADM

## 2017-08-31 RX ORDER — HYDROMORPHONE HYDROCHLORIDE 2 MG/ML
0.5 INJECTION, SOLUTION INTRAMUSCULAR; INTRAVENOUS; SUBCUTANEOUS
Status: DISCONTINUED | OUTPATIENT
Start: 2017-08-31 | End: 2017-08-31 | Stop reason: HOSPADM

## 2017-08-31 RX ORDER — DIAZEPAM 10 MG/2ML
5 INJECTION INTRAMUSCULAR AS NEEDED
Status: COMPLETED | OUTPATIENT
Start: 2017-08-31 | End: 2017-08-31

## 2017-08-31 RX ORDER — SUCCINYLCHOLINE CHLORIDE 20 MG/ML
INJECTION INTRAMUSCULAR; INTRAVENOUS AS NEEDED
Status: DISCONTINUED | OUTPATIENT
Start: 2017-08-31 | End: 2017-08-31 | Stop reason: HOSPADM

## 2017-08-31 RX ORDER — CEFAZOLIN SODIUM 2 G/50ML
2 SOLUTION INTRAVENOUS ONCE
Status: COMPLETED | OUTPATIENT
Start: 2017-08-31 | End: 2017-08-31

## 2017-08-31 RX ORDER — CEFAZOLIN SODIUM 1 G/3ML
INJECTION, POWDER, FOR SOLUTION INTRAMUSCULAR; INTRAVENOUS AS NEEDED
Status: DISCONTINUED | OUTPATIENT
Start: 2017-08-31 | End: 2017-08-31 | Stop reason: HOSPADM

## 2017-08-31 RX ORDER — HYDROMORPHONE HYDROCHLORIDE 1 MG/ML
INJECTION, SOLUTION INTRAMUSCULAR; INTRAVENOUS; SUBCUTANEOUS AS NEEDED
Status: DISCONTINUED | OUTPATIENT
Start: 2017-08-31 | End: 2017-08-31 | Stop reason: HOSPADM

## 2017-08-31 RX ORDER — LIDOCAINE HYDROCHLORIDE 20 MG/ML
INJECTION, SOLUTION EPIDURAL; INFILTRATION; INTRACAUDAL; PERINEURAL AS NEEDED
Status: DISCONTINUED | OUTPATIENT
Start: 2017-08-31 | End: 2017-08-31 | Stop reason: HOSPADM

## 2017-08-31 RX ORDER — VANCOMYCIN HYDROCHLORIDE 1 G/20ML
INJECTION, POWDER, LYOPHILIZED, FOR SOLUTION INTRAVENOUS AS NEEDED
Status: DISCONTINUED | OUTPATIENT
Start: 2017-08-31 | End: 2017-08-31 | Stop reason: HOSPADM

## 2017-08-31 RX ORDER — MIDAZOLAM HYDROCHLORIDE 1 MG/ML
INJECTION, SOLUTION INTRAMUSCULAR; INTRAVENOUS AS NEEDED
Status: DISCONTINUED | OUTPATIENT
Start: 2017-08-31 | End: 2017-08-31 | Stop reason: HOSPADM

## 2017-08-31 RX ORDER — SODIUM CHLORIDE, SODIUM LACTATE, POTASSIUM CHLORIDE, CALCIUM CHLORIDE 600; 310; 30; 20 MG/100ML; MG/100ML; MG/100ML; MG/100ML
100 INJECTION, SOLUTION INTRAVENOUS CONTINUOUS
Status: DISCONTINUED | OUTPATIENT
Start: 2017-08-31 | End: 2017-08-31 | Stop reason: HOSPADM

## 2017-08-31 RX ORDER — ONDANSETRON 2 MG/ML
4 INJECTION INTRAMUSCULAR; INTRAVENOUS ONCE
Status: DISCONTINUED | OUTPATIENT
Start: 2017-08-31 | End: 2017-08-31 | Stop reason: HOSPADM

## 2017-08-31 RX ORDER — BUPIVACAINE HYDROCHLORIDE AND EPINEPHRINE 2.5; 5 MG/ML; UG/ML
INJECTION, SOLUTION EPIDURAL; INFILTRATION; INTRACAUDAL; PERINEURAL AS NEEDED
Status: DISCONTINUED | OUTPATIENT
Start: 2017-08-31 | End: 2017-08-31 | Stop reason: HOSPADM

## 2017-08-31 RX ORDER — KETOROLAC TROMETHAMINE 30 MG/ML
30 INJECTION, SOLUTION INTRAMUSCULAR; INTRAVENOUS
Status: COMPLETED | OUTPATIENT
Start: 2017-08-31 | End: 2017-08-31

## 2017-08-31 RX ADMIN — PROPOFOL 150 MG: 10 INJECTION, EMULSION INTRAVENOUS at 08:53

## 2017-08-31 RX ADMIN — KETAMINE HYDROCHLORIDE 10 MG: 50 INJECTION, SOLUTION INTRAMUSCULAR; INTRAVENOUS at 10:26

## 2017-08-31 RX ADMIN — KETAMINE HYDROCHLORIDE 10 MG: 50 INJECTION, SOLUTION INTRAMUSCULAR; INTRAVENOUS at 13:50

## 2017-08-31 RX ADMIN — HYDROMORPHONE HYDROCHLORIDE 1 MG: 1 INJECTION, SOLUTION INTRAMUSCULAR; INTRAVENOUS; SUBCUTANEOUS at 04:19

## 2017-08-31 RX ADMIN — ONDANSETRON 4 MG: 2 INJECTION INTRAMUSCULAR; INTRAVENOUS at 13:45

## 2017-08-31 RX ADMIN — SUCCINYLCHOLINE CHLORIDE 100 MG: 20 INJECTION INTRAMUSCULAR; INTRAVENOUS at 08:53

## 2017-08-31 RX ADMIN — DEXAMETHASONE SODIUM PHOSPHATE 10 MG: 4 INJECTION, SOLUTION INTRA-ARTICULAR; INTRALESIONAL; INTRAMUSCULAR; INTRAVENOUS; SOFT TISSUE at 08:56

## 2017-08-31 RX ADMIN — KETAMINE HYDROCHLORIDE 10 MG: 50 INJECTION, SOLUTION INTRAMUSCULAR; INTRAVENOUS at 09:59

## 2017-08-31 RX ADMIN — FENTANYL CITRATE 100 MCG: 50 INJECTION, SOLUTION INTRAMUSCULAR; INTRAVENOUS at 09:08

## 2017-08-31 RX ADMIN — KETOROLAC TROMETHAMINE 30 MG: 30 INJECTION, SOLUTION INTRAMUSCULAR at 15:10

## 2017-08-31 RX ADMIN — HYDROMORPHONE HYDROCHLORIDE 0.5 MG: 1 INJECTION, SOLUTION INTRAMUSCULAR; INTRAVENOUS; SUBCUTANEOUS at 13:56

## 2017-08-31 RX ADMIN — CEFAZOLIN SODIUM 2 G: 2 SOLUTION INTRAVENOUS at 08:56

## 2017-08-31 RX ADMIN — DIAZEPAM 5 MG: 5 INJECTION, SOLUTION INTRAMUSCULAR; INTRAVENOUS at 15:35

## 2017-08-31 RX ADMIN — KETAMINE HYDROCHLORIDE 10 MG: 50 INJECTION, SOLUTION INTRAMUSCULAR; INTRAVENOUS at 12:50

## 2017-08-31 RX ADMIN — ESMOLOL HYDROCHLORIDE 30 MG: 10 INJECTION INTRAVENOUS at 14:39

## 2017-08-31 RX ADMIN — DIAZEPAM 5 MG: 5 INJECTION, SOLUTION INTRAMUSCULAR; INTRAVENOUS at 16:25

## 2017-08-31 RX ADMIN — KETAMINE HYDROCHLORIDE 10 MG: 50 INJECTION, SOLUTION INTRAMUSCULAR; INTRAVENOUS at 11:32

## 2017-08-31 RX ADMIN — ESMOLOL HYDROCHLORIDE 30 MG: 10 INJECTION INTRAVENOUS at 14:03

## 2017-08-31 RX ADMIN — HYDROMORPHONE HYDROCHLORIDE 0.5 MG: 2 INJECTION INTRAMUSCULAR; INTRAVENOUS; SUBCUTANEOUS at 08:06

## 2017-08-31 RX ADMIN — SODIUM CHLORIDE, SODIUM LACTATE, POTASSIUM CHLORIDE, AND CALCIUM CHLORIDE 100 ML/HR: 600; 310; 30; 20 INJECTION, SOLUTION INTRAVENOUS at 16:10

## 2017-08-31 RX ADMIN — HYDROMORPHONE HYDROCHLORIDE 0.5 MG: 1 INJECTION, SOLUTION INTRAMUSCULAR; INTRAVENOUS; SUBCUTANEOUS at 14:39

## 2017-08-31 RX ADMIN — HYDROMORPHONE HYDROCHLORIDE 0.5 MG: 2 INJECTION INTRAMUSCULAR; INTRAVENOUS; SUBCUTANEOUS at 15:05

## 2017-08-31 RX ADMIN — ESMOLOL HYDROCHLORIDE 30 MG: 10 INJECTION INTRAVENOUS at 14:27

## 2017-08-31 RX ADMIN — SODIUM CHLORIDE, SODIUM LACTATE, POTASSIUM CHLORIDE, AND CALCIUM CHLORIDE 50 ML/HR: 600; 310; 30; 20 INJECTION, SOLUTION INTRAVENOUS at 07:54

## 2017-08-31 RX ADMIN — DOCUSATE SODIUM 100 MG: 100 CAPSULE, LIQUID FILLED ORAL at 18:14

## 2017-08-31 RX ADMIN — FENTANYL CITRATE 50 MCG: 50 INJECTION, SOLUTION INTRAMUSCULAR; INTRAVENOUS at 13:16

## 2017-08-31 RX ADMIN — CEFAZOLIN SODIUM 1 G: 1 INJECTION, POWDER, FOR SOLUTION INTRAMUSCULAR; INTRAVENOUS at 18:14

## 2017-08-31 RX ADMIN — CEFAZOLIN SODIUM 2 G: 1 INJECTION, POWDER, FOR SOLUTION INTRAMUSCULAR; INTRAVENOUS at 13:00

## 2017-08-31 RX ADMIN — HYDROMORPHONE HYDROCHLORIDE 0.5 MG: 2 INJECTION INTRAMUSCULAR; INTRAVENOUS; SUBCUTANEOUS at 14:55

## 2017-08-31 RX ADMIN — FAMOTIDINE 20 MG: 20 TABLET ORAL at 07:52

## 2017-08-31 RX ADMIN — KETAMINE HYDROCHLORIDE 40 MG: 50 INJECTION, SOLUTION INTRAMUSCULAR; INTRAVENOUS at 08:56

## 2017-08-31 RX ADMIN — SODIUM CHLORIDE: 9 INJECTION, SOLUTION INTRAVENOUS at 11:18

## 2017-08-31 RX ADMIN — Medication 10 ML: at 08:13

## 2017-08-31 RX ADMIN — OXYCODONE HYDROCHLORIDE AND ACETAMINOPHEN 1 TABLET: 5; 325 TABLET ORAL at 19:47

## 2017-08-31 RX ADMIN — HYDROMORPHONE HYDROCHLORIDE 1 MG: 1 INJECTION, SOLUTION INTRAMUSCULAR; INTRAVENOUS; SUBCUTANEOUS at 22:39

## 2017-08-31 RX ADMIN — SODIUM CHLORIDE: 9 INJECTION, SOLUTION INTRAVENOUS at 09:03

## 2017-08-31 RX ADMIN — HYDROMORPHONE HYDROCHLORIDE 1 MG: 1 INJECTION, SOLUTION INTRAMUSCULAR; INTRAVENOUS; SUBCUTANEOUS at 08:46

## 2017-08-31 RX ADMIN — KETAMINE HYDROCHLORIDE 10 MG: 50 INJECTION, SOLUTION INTRAMUSCULAR; INTRAVENOUS at 14:21

## 2017-08-31 RX ADMIN — LIDOCAINE HYDROCHLORIDE 100 MG: 20 INJECTION, SOLUTION EPIDURAL; INFILTRATION; INTRACAUDAL; PERINEURAL at 08:51

## 2017-08-31 RX ADMIN — MIDAZOLAM HYDROCHLORIDE 2 MG: 1 INJECTION, SOLUTION INTRAMUSCULAR; INTRAVENOUS at 08:46

## 2017-08-31 RX ADMIN — FENTANYL CITRATE 50 MCG: 50 INJECTION, SOLUTION INTRAMUSCULAR; INTRAVENOUS at 10:31

## 2017-08-31 NOTE — PROGRESS NOTES
Problem: Falls - Risk of  Goal: *Absence of Falls  Document Jono Fall Risk and appropriate interventions in the flowsheet.    Outcome: Progressing Towards Goal  Fall Risk Interventions:  Mobility Interventions: Patient to call before getting OOB           Medication Interventions: Patient to call before getting OOB, Teach patient to arise slowly     Elimination Interventions: Patient to call for help with toileting needs, Toilet paper/wipes in reach, Toileting schedule/hourly rounds

## 2017-08-31 NOTE — PERIOP NOTES
1445  Patient received in PACU and connected to monitors. Vital signs stable. RN at bedside. Will continue to monitor. 1515  Resting with eyes closed. 1540  Medicated pt per MAR. Pt resting with eyes closed, snoring lightly. No c/o voiced. [address], NP, at bedside to talk with pt.    1620  Assisted pt to R side lying, wedged with pillow. Tolerating ice chips. [address] to give report. Per Vonzell Dubin, RN will call back when available. 1645  TRANSFER - OUT REPORT:    Verbal report given to Karen Jaime RN(name) on Fernandes Lesser  being transferred to 3300(unit) for routine post - op       Report consisted of patients Situation, Background, Assessment and   Recommendations(SBAR). Information from the following report(s) SBAR, Kardex, OR Summary, Intake/Output and MAR was reviewed with the receiving nurse. Lines:   Peripheral IV 08/29/17 Right Hand (Active)   Site Assessment Clean, dry, & intact 8/31/2017  4:29 PM   Phlebitis Assessment 0 8/31/2017  4:29 PM   Infiltration Assessment 0 8/31/2017  4:29 PM   Dressing Status Clean, dry, & intact 8/31/2017  4:29 PM   Dressing Type Transparent;Tape 8/31/2017  4:29 PM   Hub Color/Line Status Blue; Infusing 8/31/2017  4:29 PM   Action Taken Open ports on tubing capped 8/31/2017  2:45 PM   Alcohol Cap Used Yes 8/31/2017  2:45 PM       Peripheral IV 08/31/17 Left Hand (Active)   Site Assessment Clean, dry, & intact 8/31/2017  4:29 PM   Phlebitis Assessment 0 8/31/2017  4:29 PM   Infiltration Assessment 0 8/31/2017  4:29 PM   Dressing Status Clean, dry, & intact 8/31/2017  4:29 PM   Dressing Type Transparent;Tape 8/31/2017  4:29 PM   Hub Color/Line Status Green;Capped 8/31/2017  4:29 PM        Opportunity for questions and clarification was provided.       Patient transported with:   Registered Nurse  Tech

## 2017-08-31 NOTE — ROUTINE PROCESS
TRANSFER - IN REPORT:    Verbal report received from [facility]) on Best Arroyo  being received from PACU(unit) for routine post - op      Report consisted of patients Situation, Background, Assessment and   Recommendations(SBAR). Information from the following report(s) SBAR, Kardex, OR Summary, Intake/Output and MAR was reviewed with the receiving nurse. Opportunity for questions and clarification was provided. Assessment completed upon patients arrival to unit and care assumed.

## 2017-08-31 NOTE — BRIEF OP NOTE
BRIEF OPERATIVE NOTE    Date of Procedure: 8/31/2017   Preoperative Diagnosis: lumbar stenosis unstable sponsylilthesis   m41.9,m43.16,m48.06  Postoperative Diagnosis: lumbar stenosis unstable sponsylilthesis   m41.9,m43.16,m48.06    Procedure(s):  l2 to s1 percutaneous screw fixation,   Left l4-5, l5-s1 minimally invasive TRANSFORAMINAL LUMBAR INTERBODY FUSION , percutaneous [id number] bolts,Surgeon(s) and Role:     * Janell Severance, MD - Primary         Assistant Staff:       Surgical Staff:  Circ-1: Nelly Dubon RN  Circ-Relief: Jennifer Griffin  Radiology Technician: Emelina Wilson  Scrub Tech-1: Rick Borer  Surg Asst-1: Zelda Mcclure  Event Time In   Incision Start 3042   Incision Close      Anesthesia: General   Estimated Blood Loss: 200 cc  Specimens: * No specimens in log *   Findings: as expected   Complications: none  Implants:   Implant Name Type Inv.  Item Serial No.  Lot No. LRB No. Used Action   GRAFT BNE PUTTY BIOACTV 7.5GM -- SIGNAFUSE - [id number]  GRAFT BNE PUTTY BIOACTV 7.5GM -- [address] [facility] [id number] N/A 1 Implanted   hemasorb   [id number] N/A 1 Implanted   GRAFT BNE BIOACTV INTERFC 1GM --  - [id number]  GRAFT BNE BIOACTV INTERFC 1GM --   [employer] [id number] N/A 1 Implanted   GRAFT BNE BIOACTV INTERFC 1GM --  - [id number]  GRAFT BNE BIOACTV INTERFC 1GM --   [id number] [facility] [id number] N/A 1 Implanted   GRAFT CLLGN SCAFFOLD [id number] -- 2/PK [employer] - [id number]  GRAFT CLLGN SCAFFOLD [id number] -- 2/PK [employer]  [employer] [id number] N/A 1 Implanted   GRAFT BNE PUTTY BIOACTV 15GM -- SIGNAFUSE - [id number]  GRAFT BNE PUTTY BIOACTV 15GM -- [address] [facility] [id number] N/A 1 Implanted   GRAFT BNE BIOACTV INTERFC 1GM --  - [id number]   GRAFT BNE BIOACTV INTERFC 1GM --    [id number] [facility] [id number] N/A 1 Implanted

## 2017-08-31 NOTE — PROGRESS NOTES
[facility]  Hospitalist Division        Inpatient Daily Progress Note    Daily progress Note    Patient: Oriana Patterson MRN: [id number]  CSN: [id number]    YOB: 1950  Age: 78 y.o. Sex: female    DOA: 8/29/2017 LOS:  LOS: 2 days                    Chief Complaint:  Back pain     Subjective:      Patient examined in PACU     Objective:      Visit Vitals    /85 (BP Patient Position: At rest;Supine; Head of bed elevated (Comment degrees))    Pulse (!) 110    Temp 98.4 °F (36.9 °C)    Resp 13    Ht 5' 3\" (1.6 m)    Wt 78 kg (172 lb)    SpO2 99%    BMI 30.47 kg/m2               Physical Exam:  General appearance: lethargic, no distress   Lungs: clear to auscultation bilaterally, no wheezes or rhonchi   Heart: regular rate and rhythm, S1, S2 normal, no murmur, click, rub or gallop  Abdomen: soft, non tender, non distended. Normoactive bowel sounds  Extremities: extremities normal, atraumatic, no cyanosis or edema  Skin: Left lateral abdominal incision with Dermabond- CDI. Surgical incisions to lumbar region CDI   Neurologic: No obvious focal defects         Intake and Output:  Current Shift:  08/31 0701 - 08/31 1900  In: 3000 [I.V.:3000]  Out: 1130 [Urine:930]  Last three shifts:  08/29 1901 - 08/31 0700  In: 3732.5 [P.O.:1080; I.V.:2652.5]  Out: 4775 [Urine:4775]    No results found for this or any previous visit (from the past 24 hour(s)).         Lab Results   Component Value Date/Time    Glucose 86 08/23/2017 11:11 AM        Assessment/Plan:     Patient Active Problem List   Diagnosis Code    Lumbar scoliosis M41.9    Lumbar stenosis M48.06    Spondylolisthesis at L4-L5 level M43.16       A/P:  - S/p L2-S1 percutaneous screw fixation, Left L4-L5, L5-S1 minimally invasive tranforaminal lumbar interbody fusion  - S/p L2-L3 anterolateral/retroperitoneal interbody fusion, left iliac crest needle aspiration 8/29  - HTN: Lisinopril   - Depression: Fluoxetine   - ETOH abuse: CIWA protocol   - Tobacco abuse: Nicoderm  - DVT prophylaxis: SCDs       Guillermo Kathleen, FREYAP-BC  [address]  Pager:  [phone]  Office:  [phone]

## 2017-08-31 NOTE — PROGRESS NOTES
Daily Progress Note: 2017 1:34 PM   Admit Date: 2017    Patient seen in follow up for multiple medical problems as listed below:  Patient Active Problem List   Diagnosis Code    Lumbar scoliosis M41.9       Assesment     -S/p L2-L3 anterolateral/retroperitoneal interbody fusion, left iliac crest needle aspiration, planned further surgery  diet and mobilization, pain control per NS Team   - HTN: Lisinopril   - Depression: Fluoxetine   - ETOH abuse: CIWA protocol   - Tobacco abuse: Nicoderm      DVT Protocol Active:SCDs  Code Status:  Full Code     Disposition: possibly    Subjective:     CC: No chief complaint on file. Interval History: patient is tolerating her second surgery quite well. Surgical sites are clean. Afebrile. mobilizing    ROS: 11 point ROS negative except for back pain. Objective:     Visit Vitals    /81 (BP 1 Location: Left arm, BP Patient Position: At rest)    Pulse 88    Temp 98.5 °F (36.9 °C)    Resp 16    Ht 5' 3\" (1.6 m)    Wt 78 kg (172 lb)    SpO2 100%    BMI 30.47 kg/m2       Temp (24hrs), Av.2 °F (36.8 °C), Min:98.1 °F (36.7 °C), Max:98.5 °F (36.9 °C)        Intake/Output Summary (Last 24 hours) at 17 1313  Last data filed at 17 1302   Gross per 24 hour   Intake           5132.5 ml   Output             3450 ml   Net           1682.5 ml       Gen: AOx3, NAD  HEENT:  CHRIS, EOMI. Neck: No Bruits/JVD   Lungs:   CTAB. Good respiratory effort  Heart:   RR S1 S2 without M/R/G  Abdomen: ND,NT, BSX4,   Extremities:   No LE edema. No cyanosis.   Skin:  no jaundice/lesions      Data Review:     Meds/Labs/Tests reviewed    Current Shift:  701 - 1900  In: 2000 [I.V.:2000]  Out: 839 [Urine:700]  Last three shifts:  1901 -  0700  In: 3732.5 [P.O.:1080; I.V.:2652.5]  Out: 4775 [Urine:4775]  No results for input(s): WBC, RBC, HGB, HCT, PLT, GRANS, LYMPH, EOS, HGBEXT, HCTEXT, PLTEXT, HGBEXT, HCTEXT, PLTEXT in the last 72 hours. No results for input(s): BUN, CREA, CA, ALB, K, NA, CL, CO2, PHOS, GLU in the last 72 hours.      Lab Results   Component Value Date/Time    Glucose 86 08/23/2017 11:11 AM          Care coordination with Nursing/Consultants/staff: 5  Prior history, labs, and charting reviewed: 10    Procedures/Imaging:  No new    Total time spent with chart review, patient examination/education, discussion with staff on case,documentation and medication management / adjustment  :  30 Minutes      Dr Maribeth Alpers  [phone]

## 2017-08-31 NOTE — ANESTHESIA POSTPROCEDURE EVALUATION
Post-Anesthesia Evaluation and Assessment    Patient: Josue Ludwig MRN: [id number]  SSN: [id number]    YOB: 1950  Age: 78 y.o. Sex: female       Cardiovascular Function/Vital Signs  Visit Vitals    /81    Pulse (!) 111    Temp 36.9 °C (98.4 °F)    Resp 16    Ht 5' 3\" (1.6 m)    Wt 78 kg (172 lb)    SpO2 96%    BMI 30.47 kg/m2       Patient is status post general anesthesia for Procedure(s):  l2 to s1 percutaneous screw fixation,   l4-5, l5-s1 minimally invasive TRANSFORAMINAL LUMBAR INTERBODY FUSION. Nausea/Vomiting: None    Postoperative hydration reviewed and adequate. Pain:  Pain Scale 1: Visual (08/31/17 1529)  Pain Intensity 1: 0 (08/31/17 1529)   Managed    Neurological Status:   Neuro (WDL): Within Defined Limits (08/31/17 1445)  Neuro  LUE Motor Response: Purposeful;Spontaneous  (08/31/17 0720)  LLE Motor Response: Purposeful;Spontaneous  (08/31/17 0720)  RUE Motor Response: Purposeful;Spontaneous  (08/31/17 0720)  RLE Motor Response: Purposeful;Spontaneous  (08/31/17 0720)   At baseline    Mental Status and Level of Consciousness: Alert and oriented     Pulmonary Status:   O2 Device: Nasal cannula (08/31/17 1529)   Adequate oxygenation and airway patent    Complications related to anesthesia: None    Post-anesthesia assessment completed.  No concerns    Signed By: Frannie Capps MD     August 31, 2017

## 2017-08-31 NOTE — PROGRESS NOTES
0725 preop  Nurse present at bedside, no acute distress noted    0813 preop nurse informed this nurse per anesthesiologist instructed to give 0.5 mg dilaudid now, patient reported 10/10 pain . Ancef attached to the chart     [phone] OR staff at bedside to transport patient    1618 Bedside and Verbal shift change report given to [facility] (oncoming nurse) by Zainab Cespedes RN   (offgoing nurse). Report included the following information SBAR, Kardex and MAR.

## 2017-08-31 NOTE — ROUTINE PROCESS
Bedside and Verbal shift change report given to CECILIA Rodriguez (oncoming nurse) by Gabbie Brand (offgoing nurse). Report included the following information SBAR, Kardex, MAR and Recent Results.

## 2017-08-31 NOTE — OP NOTES
Brown Paez    Name:  Claudine Tucker  MR#:  [id number]  :  1950  Account #:  [de-identified]  Date of Adm:  2017  Date of Surgery:  2017      PREOPERATIVE DIAGNOSES:  1. Lumbar scoliosis. 2. L4-5 spondylolisthesis. 3. Lumbar stenosis. 4. Lumbar facet arthropathy. POSTOPERATIVE DIAGNOSES:  1. Lumbar scoliosis. 2. L4-5 spondylolisthesis. 3. Lumbar stenosis. 4. Lumbar facet arthropathy. PROCEDURE:  1. Percutaneous pedicle screw fixation, L2-S1.  2. S2 alar iliac screw fixation. 3. Left L4-5, L5-S1 minimally invasive transforaminal lumbar interbody  fusion. 4. Bone marrow aspiration for the purpose of grafting. 5. Fluoroscopy. SURGEON: Lenin Contreras MD    ASSISTANT: Arabella Hernandez. ANESTHESIA: General endotracheal.    ESTIMATED BLOOD LOSS: 200 mL. COMPLICATIONS: None. SPECIMEN: None. HISTORY: (Please see the prior operative note which was phase one  of two phases of her treatment). The patient had a left L2-3, L3-4  anterolateral/retroperitoneal interbody fusion 2 days ago and has done  well. She is returned to the OR for second stage. The patient was brought back to the operating room and placed under  general anesthesia without event; 2 grams of Ancef were given, 10 mg  Decadron were given. A Miranda catheter was placed. She was rolled in  the prone position on the operating room table. All pressure points  were padded. Impulse monitoring was used during this case and they  had  placed the electrodes before rolling. Her back was prepped with alcohol soaked 4 x 4's. Chlorhexidine  scrub was performed then blotted dry with a sterile towel. ChloraPrep  was applied and allowed to dry. Her hips were extended and her knees  were padded. A standard draping ensued. The pedicles were marked out from L2 to  S1 and the area was infiltrated with 0.25% Marcaine with epinephrine. Stab incisions were made.  I-PASS needles (monitoring Jamshidi  needles from [facility]) were tapped into the 9 and 3 o'clock positions  of the left and right pedicles from L2-S1. All screw stimulation values  were satisfactory. K-wires were placed, dilators were placed and the  holes were tapped. All tapping stimulation was satisfactory as well; 6.5  x 50 mm screws were placed from L2 to L5 bilaterally. At S1, 7.5 x 40  mm screw was placed. Purchase was satisfactory for all screws. All  screws stimulated satisfactorily with no drop in milliamps. At this point,  the left L5-S1 region was addressed. Retractor blades were placed at  L5-S1. The soft tissue was swept medially and laterally and medial and  lateral retractor blades were placed. The operating microscope was  brought in. The drill was used to drill off the facet and expose the  exiting traversing nerve roots and the lateral thecal sac. This was all  drilled down. While protected the neural elements, an annulotomy was  performed. The epidural tissue was coagulated with bipolar  electrocautery. After creating annulotomy, pituitary and  rongeur was used to help empty the disk space and cutting paddle was  placed. A 10 mm cutting paddle was found to be the correct size. An  up-biting curette and rasp were used to help decorticate and clean off  the endplates. Through separate fascial stabs, the bone marrow was  aspirated and then processed in the ART-21 system. This was mixed  with SignaFuse putty and Osteomatrix sponge. This was then packed  into the disk space and pushed to the contralateral side. FloSeal was  placed on the needle and the stylet was replaced. The needles  were removed. A 10 x 11 x 26-15 degree lordotic TLX cage from [employer] was  tapped in the disk space and then fully expanded to 13 mm. There was  nice lordotic correction and expansion of the interspace height. The  space was back filled with the aforementioned grafting material.    The same procedure was then carried out at L4-5. A 12 x 11 x 26-15  degree lordotic TLX cage from [employer] was used. It was back  packed with bone in the same manner. The final expansion was 15  mm. All the nerves were probed and there was no evidence of any  breach of the screws or any residual compression. FloSeal was  applied to both and the excess was irrigated away. The remaining screws were placed on the left side at this point. At this  point, attention was turned to the S2 alar iliac bolts. A midline incision was marked out and the needle was docked 0.1 mm  inferior and just lateral to the S1 foramen. This was guided into the  sacral bone and eventually into the ileum using first AP and lateral  views to assure that we were above the sciatic notch and acetabulum  and then the teardrop view to drive the final distance. K-wire  was placed. The hole was tapped and then an 8.5 x 80 mm screw was  placed on an reduction tower. This seated very nicely and had  excellent purchase. All 3 views (AP, lateral, and pelvic inlet views) showed satisfactory placement of the screws. The rods were sized, cut, contoured, passed and locking caps were tightened to final tightness with a torque device. The woulds were irrigated, vanco powder was placed, and closed in a layered fashion. Prineo was applied. Mrs. Sherice Fox jesus the procedure well. Final films showed satisfactory instrumentation placement and correction. Mrs. Madera's sister was updated.       MD SHYAM Sandoval / Yuli Ronquillo  D:  08/31/2017   14:32  T:  08/31/2017   15:10  Job #:  [id number]

## 2017-08-31 NOTE — PROGRESS NOTES
1935: Received patient in bed awake,alert and oriented x 4. No signs of distress. Denies any pain at this time. Bed low and locked. Call bell within reach. Will monitor. 0210: In bed awake,no other concerns at this time. Call bell within reach. Will monitor. 8159: In bed resting quietly, no other concerns at this time. Bed low and locked. Call bell within reach.

## 2017-09-01 PROCEDURE — 97162 PT EVAL MOD COMPLEX 30 MIN: CPT

## 2017-09-01 PROCEDURE — 74011250637 HC RX REV CODE- 250/637: Performed by: NURSE PRACTITIONER

## 2017-09-01 PROCEDURE — 74011250637 HC RX REV CODE- 250/637: Performed by: NEUROLOGICAL SURGERY

## 2017-09-01 PROCEDURE — 77010033678 HC OXYGEN DAILY

## 2017-09-01 PROCEDURE — 74011250637 HC RX REV CODE- 250/637: Performed by: INTERNAL MEDICINE

## 2017-09-01 PROCEDURE — 74011250636 HC RX REV CODE- 250/636: Performed by: NEUROLOGICAL SURGERY

## 2017-09-01 PROCEDURE — 97530 THERAPEUTIC ACTIVITIES: CPT

## 2017-09-01 PROCEDURE — 65270000029 HC RM PRIVATE

## 2017-09-01 PROCEDURE — 97166 OT EVAL MOD COMPLEX 45 MIN: CPT

## 2017-09-01 PROCEDURE — 74011000258 HC RX REV CODE- 258: Performed by: NEUROLOGICAL SURGERY

## 2017-09-01 PROCEDURE — 97535 SELF CARE MNGMENT TRAINING: CPT

## 2017-09-01 PROCEDURE — 51798 US URINE CAPACITY MEASURE: CPT

## 2017-09-01 RX ORDER — SODIUM CHLORIDE 9 MG/ML
INJECTION, SOLUTION INTRAVENOUS
Status: DISPENSED
Start: 2017-09-01 | End: 2017-09-01

## 2017-09-01 RX ORDER — OXYCODONE AND ACETAMINOPHEN 5; 325 MG/1; MG/1
1-2 TABLET ORAL
Status: DISCONTINUED | OUTPATIENT
Start: 2017-09-01 | End: 2017-09-05 | Stop reason: HOSPADM

## 2017-09-01 RX ORDER — SODIUM CHLORIDE 900 MG/100ML
INJECTION INTRAVENOUS
Status: DISPENSED
Start: 2017-09-01 | End: 2017-09-01

## 2017-09-01 RX ADMIN — OXYCODONE HYDROCHLORIDE AND ACETAMINOPHEN 2 TABLET: 5; 325 TABLET ORAL at 15:26

## 2017-09-01 RX ADMIN — METHOCARBAMOL 750 MG: 750 TABLET ORAL at 11:43

## 2017-09-01 RX ADMIN — DOCUSATE SODIUM 100 MG: 100 CAPSULE, LIQUID FILLED ORAL at 18:25

## 2017-09-01 RX ADMIN — FLUOXETINE 20 MG: 20 CAPSULE ORAL at 08:04

## 2017-09-01 RX ADMIN — HYDROMORPHONE HYDROCHLORIDE 1 MG: 1 INJECTION, SOLUTION INTRAMUSCULAR; INTRAVENOUS; SUBCUTANEOUS at 07:50

## 2017-09-01 RX ADMIN — OXYCODONE HYDROCHLORIDE AND ACETAMINOPHEN 1 TABLET: 5; 325 TABLET ORAL at 01:44

## 2017-09-01 RX ADMIN — METHOCARBAMOL 750 MG: 750 TABLET ORAL at 18:25

## 2017-09-01 RX ADMIN — CEFAZOLIN SODIUM 1 G: 1 INJECTION, POWDER, FOR SOLUTION INTRAMUSCULAR; INTRAVENOUS at 10:00

## 2017-09-01 RX ADMIN — LISINOPRIL 20 MG: 20 TABLET ORAL at 08:04

## 2017-09-01 RX ADMIN — OXYCODONE HYDROCHLORIDE AND ACETAMINOPHEN 2 TABLET: 5; 325 TABLET ORAL at 10:22

## 2017-09-01 RX ADMIN — DOCUSATE SODIUM 100 MG: 100 CAPSULE, LIQUID FILLED ORAL at 08:04

## 2017-09-01 RX ADMIN — CEFAZOLIN SODIUM 1 G: 1 INJECTION, POWDER, FOR SOLUTION INTRAMUSCULAR; INTRAVENOUS at 01:39

## 2017-09-01 RX ADMIN — OXYCODONE HYDROCHLORIDE AND ACETAMINOPHEN 2 TABLET: 5; 325 TABLET ORAL at 21:02

## 2017-09-01 NOTE — ROUTINE PROCESS
1930 Received patient from Oscar Oseguera RN. Patient is alert and oriented x 4.  2300 noted patient was asleep, unsure of TOD Reoriented patient. 7823 Patient noted her throat was sore, when consulting charge nurse R/T surgery she had this is normal.   0400 When asked if patient wanted to walk she refused x 3 this shift. Will continue to ask.   0630 Removed bruner catheter. 1236 Bedside and Verbal shift change report given to Anu Fernandez RN (oncoming nurse) by Nayan Mukherjee RN (offgoing nurse). Report included the following information SBAR, Kardex, Intake/Output, MAR and Recent Results.

## 2017-09-01 NOTE — PROGRESS NOTES
0800  Due to void by 12 noon post bruner out, on CIWA, monitor, moving all extremities but  Her left leg is weaker, per pt  MD told her due to muscles that was been cut, that she has this feeling. 1000  Medicated for pain, been reposition several times due to discomfort, written on the board about  Next pain med, using Robaxin, Percoset and Dilaudid as ordered. 1200  Assisted to the  Bathroom,  Voided only 50 cc, bladder scan is 95cc, encourage  Po fluids. 1410  Assisted by [facility] to the bathroom, voided 500 cc and bladder scan is zero, sitting on the chair  Right now, looks comfortable at this time. [address] 4x today sat on the chair   4x and walk to the bathroom, looks pain is easing but she said she is  Still  Hurting and wants IV pain med, informed about her de sitting at , and does triflo up to 5x only, refused to do it more due to pain when moving,  Using triflo, given it every hour, place 02 at 2 liters. .    1900  continuous ice pack to both incision, told her that oral pain med to be given more than IV due to de sat at , triflo been  instrucuted and  Goes up to 92%, aware to use triflo but need encouragement , moving all extremities, no tingling and no numbness of extremities.

## 2017-09-01 NOTE — PROGRESS NOTES
Problem: Falls - Risk of  Goal: *Absence of Falls  Document Jono Fall Risk and appropriate interventions in the flowsheet.    Outcome: Progressing Towards Goal  Fall Risk Interventions:  Mobility Interventions: Patient to call before getting OOB           Medication Interventions: Assess postural VS orthostatic hypotension, Teach patient to arise slowly, Patient to call before getting OOB     Elimination Interventions: Call light in reach

## 2017-09-01 NOTE — PROGRESS NOTES
Problem: Self Care Deficits Care Plan (Adult)  Goal: *Acute Goals and Plan of Care (Insert Text)  Occupational Therapy Goals  Initiated 9/1/2017 within 7 day(s). 1. Patient will perform functional task in standing for 5 minutes with supervision for balance to increase activity tolerance for ADLs. 2. Patient will perform lower body dressing with modified independence utilizing AE, prn.  3. Patient will perform grooming tasks while standing with supervision for balance. 4. Patient will perform toilet transfers with modified independence. 5. Patient will perform all aspects of toileting with modified independence. 6. Patient will participate in upper extremity therapeutic exercise/activities with supervision/set-up for 8 minutes to increase BUE strength for ADLs. 7. Patient will utilize energy conservation techniques during functional activities with minimal verbal cues. Outcome: Progressing Towards Goal  OCCUPATIONAL THERAPY EVALUATION     Patient: Aracely Martinez (15 y.o. female)  Date: 9/1/2017  Primary Diagnosis: lumbar stenosis/stenosis/unstable spondylolisthesis  m41.9,m43.16,m48.06  Lumbar scoliosis  lumbar stenosis unstable sponsylilthesis   m41.9,m43.16,m48.06  Procedure(s) (LRB):  l2 to s1 percutaneous screw fixation,  (N/A)  l4-5, l5-s1 minimally invasive TRANSFORAMINAL LUMBAR INTERBODY FUSION (N/A) 1 Day Post-Op   Precautions: Fall, Back, Spinal      ASSESSMENT :  Based on the objective data described below, the patient presents with impairments with regard to bed mobility, activity tolerance and independence in ADLs secondary to lumbar fusion. Pt sidelying on R side on arrival; c/o 7/10 pain on arrival but agreeable to therapy. Mod A to maneuver to EOB; mod A to don LSO. Pt educated on back precautions, verbalized understanding. Min A/additional time to stand with walker with skilled instruction on BUE postioning for safety. SBA to maneuver to bathroom for toileting task.   CGA/min A for toilet transfer with vc's for BUE positioning. Supervision to void; SBA for pericare/clothing management. Needs reinforcement on BUE positioning for safety. Mod A to return to sidelying (assist with BLE management). Pt left sidelying on R side, needs within reach, sister at bedside, SCDs applied. Recommend rehab upon d/c to maximize independence in transfers, functional mobility, and ADLs as pt lives alone. Kyree Early, RN aware of session. Patient will benefit from skilled intervention to address the above impairments. Patients rehabilitation potential is considered to be Good  Factors which may influence rehabilitation potential include:   [ ]             None noted  [ ]             Mental ability/status  [X]             Medical condition  [ ]             Home/family situation and support systems  [ ]             Safety awareness  [ ]             Pain tolerance/management  [ ]             Other:      Recommendations for nursing: up with assist x1 and RW          PLAN :  Recommendations and Planned Interventions:  [X]               Self Care Training                  [X]        Therapeutic Activities  [X]               Functional Mobility Training    [ ]        Cognitive Retraining  [X]               Therapeutic Exercises           [X]        Endurance Activities  [X]               Balance Training                   [ ]        Neuromuscular Re-Education  [ ]               Visual/Perceptual Training     [X]   Home Safety Training  [X]               Patient Education                 [X]        Family Training/Education  [ ]               Other (comment):     Frequency/Duration: Patient will be followed by occupational therapy 4-7 times a week to address goals. Discharge Recommendations: Rehab  Further Equipment Recommendations for Discharge: bedside commode       SUBJECTIVE:   Patient stated I'll stay at my sisters house after rehab.       OBJECTIVE DATA SUMMARY:       Past Medical History:   Diagnosis Date    Arthritis       Soriatic    Hypertension      Nausea & vomiting       Past Surgical History:   Procedure Laterality Date    HX BREAST BIOPSY        HX ORTHOPAEDIC Bilateral       carpal tunnel    HX ORTHOPAEDIC Bilateral       Bunnionectomy    HX ORTHOPAEDIC Right       torn Meniscus    HX ORTHOPAEDIC Left       2nd toe    NEUROLOGICAL PROCEDURE UNLISTED         Barriers to Learning/Limitations: None  Compensate with: visual, verbal, tactile, kinesthetic cues/model     GCODES:  Self Care  Current  CL= 60-79%   Goal  CI= 1-19%. The severity rating is based on the Other Functional Assessmnet, MMT, ROM     Eval Complexity: History: MEDIUM Complexity : Expanded review of history including physical, cognitive and psychosocial  history ; Examination: MEDIUM Complexity : 3-5 performance deficits relating to physical, cognitive , or psychosocial skils that result in activity limitations and / or participation restrictions; Decision Making:MEDIUM Complexity : Patient may present with comorbidities that affect occupational performnce. Miniml to moderate modification of tasks or assistance (eg, physical or verbal ) with assesment(s) is necessary to enable patient to complete evaluation      Prior Level of Function/Home Situation: Pt was independent with basic self care tasks and functional mobility PTA. Home Situation  Home Environment: Private residence  # Steps to Enter: 2  One/Two Story Residence: One story  Living Alone: Yes  Support Systems: Family member(s)  Patient Expects to be Discharged to[de-identified] Rehabilitation facility  Current DME Used/Available at Home: Davene Capes, straight, Grab bars, Shower chair, Walker, rolling, Wheelchair  Tub or Shower Type: Shower (has shower chair and grab bars)  [X]  Right hand dominant          [ ]  Left hand dominant     Cognitive/Behavioral Status:  Neurologic State: Alert  Orientation Level: Oriented X4  Cognition: Appropriate decision making; Appropriate for age attention/concentration; Follows commands  Safety/Judgement: Awareness of environment; Fall prevention      Skin: Intact (BUEs)  Edema: None noted (BUEs)     Vision/Perceptual:    Acuity: Within Defined Limits       Coordination:  Coordination: Within functional limits (BUEs)  Fine Motor Skills-Upper: Right Intact; Left Intact    Gross Motor Skills-Upper: Right Intact; Left Intact      Balance:  Sitting: Impaired  Sitting - Static: Fair (occasional) (Fair+)  Sitting - Dynamic: Fair (occasional)  Standing: Impaired  Standing - Static: Fair  Standing - Dynamic : Fair      Strength:  Strength: Generally decreased, functional (BUEs: 3+/5)     Tone & Sensation:  Sensation: Intact (BUEs)     Range of Motion:  AROM: Generally decreased, functional (BUEs: 3/4 shoulder flex; full elbow flex)     Functional Mobility and Transfers for ADLs:  Bed Mobility:   Supine to Sit: Moderate assistance; Additional time  Sit to Supine: Moderate assistance (for BLE management)     Transfers:  Sit to Stand: Minimum assistance; Additional time              Toilet Transfer : Minimum assistance; Additional time; Adaptive equipment                ADL Assessment:  Feeding: Setup;Supervision  Oral Facial Hygiene/Grooming: Setup;Supervision  Bathing: Maximum assistance  Upper Body Dressing: Moderate assistance  Lower Body Dressing: Maximum assistance  Toileting: Supervision     ADL Intervention:  Toileting  Toileting Assistance: Supervision/set up  Bladder Hygiene: Stand-by assistance  Clothing Management: Stand-by assistance  Cues: Verbal cues provided  Adaptive Equipment: Grab bars;Elevated seat     CGA/min A for toilet transfer with vc's for BUE positioning and use of grab bar. Supervision to void; SBA for pericare and clothing management. Needs reinforcement on BUE positioning for safety. Cognitive Retraining  Safety/Judgement: Awareness of environment; Fall prevention     Pain:  Pre treatment pain level: 7/10  Post treatment pain level: 7/10  Pain Scale 1: Numeric (0 - 10)  Pain Intensity 1: 7 (Sunshine RN notified)  Pain Location 1: Back  Pain Orientation 1: Lower; Posterior  Pain Description 1: Aching      Activity Tolerance:   Please refer to the flowsheet for vital signs taken during this treatment. After treatment:   [ ] Patient left in no apparent distress sitting up in chair  [X] Patient left in no apparent distress in bed  [X] Call bell left within reach  [X] Nursing notified  [X] Caregiver present  [ ] Bed alarm activated      COMMUNICATION/EDUCATION: Pt/family educated on role of OT, POC, and home safety. They verbalized understanding.   [X] Home safety education was provided and the patient/caregiver indicated understanding. [X] Patient/family have participated as able in goal setting and plan of care. [X] Patient/family agree to work toward stated goals and plan of care. [ ] Patient understands intent and goals of therapy, but is neutral about his/her participation. [ ] Patient is unable to participate in goal setting and plan of care. Thank you for this referral.     Vaishali Coulter MS OTR/L  Time Calculation: 21 mins          [address]     DO NOT TWIST your back. DO NOT BEND to  objects. Use the Squat Lift method or use a *REACHER STICK. Get out of bed using the Log Roll method. DO NOT LIFT more than 5 pounds until cleared by your physician. DO NOT RIDE in a car except to go home from the hospital or to see your physician. DO NOT DRIVE until cleared by your physician. Limit sitting to less than one hour at a time. Use a long handled back brush/sponge to wash your feet if you cannot reach them. Squat or sit on a chair when removing items from the refrigerator. Put all frequently used kitchen items within easy reach. Sit to put on pants, socks, and shoes. Do not perform lower body dressing while standing up.      Carry items close to your body.     If needed, sit on a shower chair and use an extended hand-held shower for bathing. Do not shower until cleared by physician. Conserve energy by pacing  yourself during your daily activities. *Reachers are available at local drug stores for about $10 - $15 or can be ordered from a catalog provided by the therapy department. In drug stores they are often sold  under the name of Kirby Rae. 

## 2017-09-01 NOTE — PROGRESS NOTES
NEUROSURGERY PROGRESS NOTE   ADMIT DATE: 8/29/2017              POD#3 and 1  Dx:  lumbar stenosis/stenosis/unstable spondylolisthesis  m41.9,m43.16,m48.06  Lumbar scoliosis  lumbar stenosis unstable sponsylilthesis   m41.9,m43.16,m48.06         SUBJECTIVE: Doing well. Incisional soreness as expected        EXAM:    Visit Vitals    /84 (BP 1 Location: Left arm, BP Patient Position: At rest)    Pulse 93    Temp 97.9 °F (36.6 °C)    Resp 16    Ht 5' 3\" (1.6 m)    Wt 78 kg (172 lb)    SpO2 98%    BMI 30.47 kg/m2       Alert and appropriate. Motor and sensory function are grossly intact. The wound dressing is clean/dry/intact. LABS:   No results found for this or any previous visit (from the past 24 hour(s)).     IMPRESSION: Normal post-operative convalescence    PLAN:    Mobilize with PT/OT

## 2017-09-01 NOTE — PROGRESS NOTES
Problem: Falls - Risk of  Goal: *Absence of Falls  Document Jono Fall Risk and appropriate interventions in the flowsheet.    Outcome: Progressing Towards Goal  Fall Risk Interventions:  Mobility Interventions: Patient to call before getting OOB           Medication Interventions: Assess postural VS orthostatic hypotension, Teach patient to arise slowly, Patient to call before getting OOB     Elimination Interventions: Call light in reach, Patient to call for help with toileting needs

## 2017-09-01 NOTE — PROGRESS NOTES
Patient arrived in room 3308 on bed, pt is alert/oriented X 4, NAD noted, bruner's present, bed at the lowest position with wheels locked, call bell within reach, family at bedside. 1800 Diet tray ordered at this time. 1830 Patient is resting in bed, denies any pain, call bell within reach, family at bedside, will continue to monitor her. Bedside and Verbal shift change report given to [facility]) by Suzi Oh (offgoing nurse). Report included the following information SBAR, Kardex, OR Summary, Intake/Output and MAR.

## 2017-09-01 NOTE — PROGRESS NOTES
Called and spoke to Dr Harvey's nurse, requested PT and OT evaluation and treat orders. Patient discharge plan is for [facility] who require therapy evaluation for admission. Ordered entered into e discharge.   Kaden Ahmadi RN

## 2017-09-01 NOTE — PROGRESS NOTES
Therapy evaluations in e discharge. I have spoken to [facility] with [facility] and she stated the patient will be accepted if the physician elects to discharge her over the holiday weekend. Ms Geraldine Sanchez, the weekend  will need to call  [phone] to inform them of the patients discharge. I have left an e mail for Ms Geraldine Sanchez to this effect.  Abida Simpson RN

## 2017-09-01 NOTE — PROGRESS NOTES
Problem: Mobility Impaired (Adult and Pediatric)  Goal: *Acute Goals and Plan of Care (Insert Text)  Physical Therapy Goals  Initiated 9/1/2017 and to be accomplished within 7 day(s)  1. Patient will move from supine to sit and sit to supine , scoot up and down and roll side to side in bed with modified independence. 2. Patient will transfer from bed to chair and chair to bed with modified independence using the least restrictive device. 3. Patient will perform sit to stand with modified independence. 4. Patient will ambulate with modified independence for 300 feet with the least restrictive device. 5. Patient will ascend/descend 3 stairs with handrail(s) with modified independence. Outcome: Progressing Towards Goal  PHYSICAL THERAPY EVALUATION        Patient: Aaliyah Saucedo (89 y.o. female)  Date: 9/1/2017  Primary Diagnosis: lumbar stenosis/stenosis/unstable spondylolisthesis  m41.9,m43.16,m48.06  Lumbar scoliosis  lumbar stenosis unstable sponsylilthesis   m41.9,m43.16,m48.06  Procedure(s) (LRB):  l2 to s1 percutaneous screw fixation,  (N/A)  l4-5, l5-s1 minimally invasive TRANSFORAMINAL LUMBAR INTERBODY FUSION (N/A) 1 Day Post-Op   Precautions:   Fall, Back, Spinal      ASSESSMENT :  Based on the objective data described below, the patient presents with Decreased Strength  Decreased Transfer Abilities  Decreased Ambulation Ability/Technique  Decreased Balance  Increased Pain  Decreased Activity Tolerance  Decreased Knowledge of Precautions secondary to lumbar pain, two surgeries for back and precautions. Patient requires between moderate assistance  and minimal assistance/contact guard assist for bed mobility, transfers and ambulation. patient required max A for don /doff back brace. Pain reported 7/10 pre and post just given pain meds. Education on safety precautions and transfer technique needs reinforcement. Patient demonstrates a good understanding of lumbar precautions.   Patient will benefit from skilled intervention to address the above impairments. Patients rehabilitation potential is considered to be Fair  Factors which may influence rehabilitation potential include:   [ ]         None noted  [ ]         Mental ability/status  [X]         Medical condition  [ ]         Home/family situation and support systems  [ ]         Safety awareness  [X]         Pain tolerance/management  [ ]         Other:        PLAN :  Recommendations and Planned Interventions:  [X]           Bed Mobility Training             [X]    Neuromuscular Re-Education  [X]           Transfer Training                   [X]    Orthotic/Prosthetic Training  [X]           Gait Training                          [ ]    Modalities  [ ]           Therapeutic Exercises          [ ]    Edema Management/Control  [X]           Therapeutic Activities            [X]    Patient and Family Training/Education  [ ]           Other (comment):     Frequency/Duration: Patient will be followed by physical therapy 1-2 times per day/4-7 days per week to address goals. Discharge Recommendations: Rehab and [facility]  Further Equipment Recommendations for Discharge: tbd reported to have equipment already and N/A       SUBJECTIVE:   Patient stated .      OBJECTIVE DATA SUMMARY:       Past Medical History:   Diagnosis Date    Arthritis       Soriatic    Hypertension      Nausea & vomiting       Past Surgical History:   Procedure Laterality Date    HX BREAST BIOPSY        HX ORTHOPAEDIC Bilateral       carpal tunnel    HX ORTHOPAEDIC Bilateral       Bunnionectomy    HX ORTHOPAEDIC Right       torn Meniscus    HX ORTHOPAEDIC Left       2nd toe    NEUROLOGICAL PROCEDURE UNLISTED         Barriers to Learning/Limitations: None  Compensate with: visual, verbal, tactile, kinesthetic cues/model  GCODES(GP):Mobility  Current  CK= 40-59%   Goal  CI= 1-19%.   The severity rating is based on the Other [employer] Scale3/5   [facility] Standing Balance Scale3/5  0: Pt performs 25% or less of standing activity (Max assist) CN, 100% impaired. 1: Pt supports self with upper extremities but requires therapist assistance. Pt performs 25-50% of effort (Mod assist) CM, 80% to <100% impaired. 1+: Pt supports self with upper extremities but requires therapist assistance. Pt performs >50% effort. (Min assist). CL, 60% to <80% impaired. 2: Pt supports self independently with both upper extremities (walker, crutches, parallel bars). CL, 60% to <80% impaired. 2+: Pt support self independently with 1 upper extremity (cane, crutch, 1 parallel bar). CK, 40% to <60% impaired. 3: Pt stands without upper extremity support for up to 30 seconds. CK, 40% to <60% impaired. 3+: Pt stands without upper extremity support for 30 seconds or greater. CJ, 20% to <40% impaired. 4: Pt independently moves and returns center of gravity 1-2 inches in one plane. CJ, 20% to <40% impaired. 4+: Pt independently moves and returns center of gravity 1-2 inches in multiple planes. CI, 1% to <20% impaired. 5: Pt independently moves and returns center of gravity in all planes greater than 2 inches. CH, 0% impaired.   Eval Complexity: History: HIGH Complexity :3+ comorbidities / personal factors will impact the outcome/ POC Exam:MEDIUM Complexity : 3 Standardized tests and measures addressing body structure, function, activity limitation and / or participation in recreation  Presentation: MEDIUM Complexity : Evolving with changing characteristics  Clinical Decision Making:Medium Complexity [facility] Standing Balance Scale3/5 Overall Complexity:MEDIUM  Prior Level of Function/Home Situation: I with adl's and use of  Assistive device at home   Home Situation  Home Environment: Private residence  # Steps to Enter: 2  One/Two Story Residence: One story  Living Alone: Yes  Support Systems: Family member(s)  Patient Expects to be Discharged to[de-identified] Rehabilitation facility  Current DME Used/Available at Home: Jacqulyne Skeeters, straight, Walker, rolling, Wheelchair, Shower chair  Tub or Shower Type: Shower (has shower chair and grab bars)  Critical Behavior:  Neurologic State: Alert  Orientation Level: Oriented X4  Cognition: Follows commands  Safety/Judgement: Fall prevention  Psychosocial  Patient Behaviors: Calm; Cooperative  Family  Behaviors: Appropriate for situation;Calm; Cooperative  Purposeful Interaction: Yes  Family  Behaviors: Appropriate for situation;Calm; Cooperative  Skin Integrity: Incision (comment); Wound (add Wound LDA)  Skin Integumentary  Skin Integrity: Incision (comment); Wound (add Wound LDA)  Wound Hip Left-Periwound Skin Condition: Ecchymosis  Strength:    Strength:  ( 3/5 both legs )  Tone & Sensation:   Sensation: Intact (BUEs)  Range Of Motion:  AROM: Generally decreased, functional (both legs end rang of knees  and hips )  Functional Mobility:  Bed Mobility:  Rolling: Moderate assistance;Minimum assistance; Additional time;Assist x1  Supine to Sit: Moderate assistance; Additional time;Assist x1  Sit to Supine: Moderate assistance; Additional time;Assist x1  Transfers:  Sit to Stand: Minimum assistance; Moderate assistance; Additional time;Assist x1  Stand to Sit: Minimum assistance; Moderate assistance; Additional time;Assist x1  Balance:   Sitting: Impaired  Sitting - Static: Fair (occasional); Good (unsupported)  Sitting - Dynamic: Fair (occasional)  Standing: Impaired  Standing - Static: Fair  Standing - Dynamic : Fair  Ambulation/Gait Training:  Distance (ft): 14 Feet (ft)  Assistive Device: Walker, rolling;Brace/Splint  Ambulation - Level of Assistance: Minimal assistance;Contact guard assistance; Additional time;Assist x1  Gait Description (WDL): Exceptions to WDL  Gait Abnormalities: Antalgic;Decreased step clearance;Shuffling gait  Base of Support: Center of gravity altered  Speed/Janice: Delayed; Slow  Step Length: Left shortened;Right shortened  Interventions: Safety awareness training;Verbal cues; Visual/Demos     Pain:  Pain Scale 1: Numeric (0 - 10)  Pain Intensity 1: 7  Pain Location 1: Back  Pain Orientation 1: Lower; Posterior  Pain Description 1: Aching  Pain Intervention(s) 1: Medication (see MAR)  Ice on back   Activity Tolerance:   Fair   Please refer to the flowsheet for vital signs taken during this treatment. After treatment:   [ ]         Patient left in no apparent distress sitting up in chair  [X]         Patient left in no apparent distress in bed  [X]         Call bell left within reach  [X]         Nursing notified  [X]         Caregiver present  [ ]         Bed alarm activated      COMMUNICATION/EDUCATION:   [X]         Fall prevention education was provided and the patient/caregiver indicated understanding. [X]         Patient/family have participated as able in goal setting and plan of care. [X]         Patient/family agree to work toward stated goals and plan of care. [ ]         Patient understands intent and goals of therapy, but is neutral about his/her participation. [ ]         Patient is unable to participate in goal setting and plan of care. Patient educated on role of physical therapy and lumbar precautions.  Needs reinforcement        Thank you for this referral.  Calvin Melendrez, PT   Time Calculation: 24 mins

## 2017-09-01 NOTE — ROUTINE PROCESS
Bedside and Verbal shift change report given to Harsh Dover   (oncoming nurse) by Bruce Bryan RN   (offgoing nurse). Report included the following information SBAR, Kardex, MAR and Recent Results.

## 2017-09-02 LAB
HCT VFR BLD AUTO: 33.8 % (ref 35–45)
HGB BLD-MCNC: 11.1 G/DL (ref 12–16)

## 2017-09-02 PROCEDURE — 74011250636 HC RX REV CODE- 250/636: Performed by: NEUROLOGICAL SURGERY

## 2017-09-02 PROCEDURE — 77010033678 HC OXYGEN DAILY

## 2017-09-02 PROCEDURE — 74011250637 HC RX REV CODE- 250/637: Performed by: NEUROLOGICAL SURGERY

## 2017-09-02 PROCEDURE — 97116 GAIT TRAINING THERAPY: CPT

## 2017-09-02 PROCEDURE — 36415 COLL VENOUS BLD VENIPUNCTURE: CPT | Performed by: NEUROLOGICAL SURGERY

## 2017-09-02 PROCEDURE — 65270000029 HC RM PRIVATE

## 2017-09-02 PROCEDURE — 85018 HEMOGLOBIN: CPT | Performed by: NEUROLOGICAL SURGERY

## 2017-09-02 PROCEDURE — 74011250637 HC RX REV CODE- 250/637: Performed by: INTERNAL MEDICINE

## 2017-09-02 PROCEDURE — 74011250637 HC RX REV CODE- 250/637: Performed by: NURSE PRACTITIONER

## 2017-09-02 RX ORDER — BISACODYL 5 MG
5 TABLET, DELAYED RELEASE (ENTERIC COATED) ORAL DAILY
Status: DISCONTINUED | OUTPATIENT
Start: 2017-09-02 | End: 2017-09-05 | Stop reason: HOSPADM

## 2017-09-02 RX ORDER — POLYETHYLENE GLYCOL 3350 17 G/17G
17 POWDER, FOR SOLUTION ORAL DAILY
Status: DISCONTINUED | OUTPATIENT
Start: 2017-09-02 | End: 2017-09-05 | Stop reason: HOSPADM

## 2017-09-02 RX ADMIN — BISACODYL 5 MG: 5 TABLET, COATED ORAL at 08:48

## 2017-09-02 RX ADMIN — OXYCODONE HYDROCHLORIDE AND ACETAMINOPHEN 2 TABLET: 5; 325 TABLET ORAL at 13:52

## 2017-09-02 RX ADMIN — DOCUSATE SODIUM 100 MG: 100 CAPSULE, LIQUID FILLED ORAL at 08:47

## 2017-09-02 RX ADMIN — LISINOPRIL 20 MG: 20 TABLET ORAL at 08:54

## 2017-09-02 RX ADMIN — POLYETHYLENE GLYCOL (3350) 17 G: 17 POWDER, FOR SOLUTION ORAL at 08:48

## 2017-09-02 RX ADMIN — DOCUSATE SODIUM 100 MG: 100 CAPSULE, LIQUID FILLED ORAL at 18:13

## 2017-09-02 RX ADMIN — OXYCODONE HYDROCHLORIDE AND ACETAMINOPHEN 2 TABLET: 5; 325 TABLET ORAL at 01:56

## 2017-09-02 RX ADMIN — METHOCARBAMOL 750 MG: 750 TABLET ORAL at 13:52

## 2017-09-02 RX ADMIN — OXYCODONE HYDROCHLORIDE AND ACETAMINOPHEN 2 TABLET: 5; 325 TABLET ORAL at 07:35

## 2017-09-02 RX ADMIN — FLUOXETINE 20 MG: 20 CAPSULE ORAL at 08:48

## 2017-09-02 RX ADMIN — HYDROMORPHONE HYDROCHLORIDE 1 MG: 1 INJECTION, SOLUTION INTRAMUSCULAR; INTRAVENOUS; SUBCUTANEOUS at 21:42

## 2017-09-02 NOTE — PROGRESS NOTES
Problem: Falls - Risk of  Goal: *Absence of Falls  Document Jono Fall Risk and appropriate interventions in the flowsheet.    Outcome: Progressing Towards Goal  Fall Risk Interventions:  Mobility Interventions: Patient to call before getting OOB           Medication Interventions: Patient to call before getting OOB     Elimination Interventions: Call light in reach

## 2017-09-02 NOTE — PROGRESS NOTES
Neurosurgery Progress Note;   Patient following 2 stage procedures for lumbar scoliosis  S: more sore today second stage a bit more painful  O; afebrile and stable vital signs. No labs as yet. Left side sound and back wounds are a bit bruised but intact and dry.  Leg function is 5.5 voiding but is constipated minimal nausea  A; doing well  P; check h/h activity as tolerated  Increase bowel program pt ot

## 2017-09-02 NOTE — PROGRESS NOTES
Problem: Mobility Impaired (Adult and Pediatric)  Goal: *Acute Goals and Plan of Care (Insert Text)  Physical Therapy Goals  Initiated 9/1/2017 and to be accomplished within 7 day(s)  1. Patient will move from supine to sit and sit to supine , scoot up and down and roll side to side in bed with modified independence. 2. Patient will transfer from bed to chair and chair to bed with modified independence using the least restrictive device. 3. Patient will perform sit to stand with modified independence. 4. Patient will ambulate with modified independence for 300 feet with the least restrictive device. 5. Patient will ascend/descend 3 stairs with handrail(s) with modified independence. PHYSICAL THERAPY TREATMENT     Patient: Josue Ludwig (84 y.o. female)  Date: 9/2/2017  Diagnosis: lumbar stenosis/stenosis/unstable spondylolisthesis  m41.9,m43.16,m48.06  Lumbar scoliosis  lumbar stenosis unstable sponsylilthesis   m41.9,m43.16,m48.06 Lumbar scoliosis  Procedure(s) (LRB):  l2 to s1 percutaneous screw fixation,  (N/A)  l4-5, l5-s1 minimally invasive TRANSFORAMINAL LUMBAR INTERBODY FUSION (N/A) 2 Days Post-Op  Precautions: Fall, Back, Spinal  Chart, physical therapy assessment, plan of care and goals were reviewed. ASSESSMENT:  Patient required min Assist with bed mobility, Min/CGA with transfers sit to stand. CGA gait with rolling walker 120 feet. Verbal cues for donning back brace. Left up in chair after treatment. Sister in room with patient. EDUCATION: log roll, donning back brace. Transfers, Gait  Progression toward goals:        Improving appropriately and progressing toward goals  Recommendations for nursing:  Written on communication board: OOB with assist of 1  Verbally communicated to: nurse Palomo       PLAN:  Patient continues to benefit from skilled intervention to address the above impairments. Continue treatment per established plan of care.   Discharge Recommendations: [facility] versus Home with Home Health  Further Equipment Recommendations for Discharge:  rolling walker       SUBJECTIVE:   Patient stated Hoping to go to rehab on Tuesday.       OBJECTIVE DATA SUMMARY:   Critical Behavior:  Neurologic State: Alert  Orientation Level: Oriented X4  Cognition: Appropriate decision making, Appropriate for age attention/concentration, Appropriate safety awareness, Follows commands  Safety/Judgement: Fall prevention     Functional Mobility Training:  Bed Mobility:  Rolling: Minimum assistance; Additional time  Supine to Sit: Minimum assistance; Additional time  Transfers:  Sit to Stand: Contact guard assistance;Minimum assistance  Stand to Sit: Contact guard assistance  Balance:  Sitting: Impaired  Sitting - Static: Fair (occasional); Good (unsupported)  Sitting - Dynamic: Fair (occasional)  Standing: Impaired  Standing - Static: Fair  Standing - Dynamic : Fair  Ambulation/Gait Training:  Distance (ft): 120 Feet (ft)  Assistive Device: Brace/Splint; Walker, rolling  Ambulation - Level of Assistance: Contact guard assistance  Gait Abnormalities: Antalgic;Decreased step clearance  Base of Support: Center of gravity altered  Speed/Janice: Slow  Step Length: Left shortened;Right shortened  Interventions: Safety awareness training;Verbal cues; Visual/Demos     Vital Signs  Temp: 98.2 °F (36.8 °C)     Pulse (Heart Rate): 99     BP: 117/73     Resp Rate: 16     O2 Sat (%): 94 %      Pain:  Pre treatment pain level:  4/10 low back  Post treatment pain level:  4/10 low back  Pain Scale 1: Numeric (0 - 10)  Pain Intensity 1: 7  Pain Location 1: Back  Pain Orientation 1: Lower; Posterior  Pain Description 1: Aching  Pain Intervention(s) 1: Medication (see MAR); Relaxation technique;Repositioned      Activity Tolerance:   Fair     After treatment:   Patient left in no apparent distress sitting up in chair  Call bell left within reach  Nursing notified  Sister present         Bard Garcia, PT  Time Calculation: 15 Minutes

## 2017-09-02 NOTE — PROGRESS NOTES
Daily Progress Note: 2017 1:34 PM   Admit Date: 2017    Patient seen in follow up for multiple medical problems as listed below:  Patient Active Problem List   Diagnosis Code    Lumbar scoliosis M41.9    Lumbar stenosis M48.06    Spondylolisthesis at L4-L5 level M43.16       Assesment       -S/p L2-L3 anterolateral/retroperitoneal interbody fusion, left iliac crest needle aspiration, planned further surgery  diet and mobilization, pain control per NS Team   - HTN: Lisinopril   - Depression: Fluoxetine   - ETOH abuse: CIWA protocol   - Tobacco abuse: Nicoderm      DVT Protocol Active:SCDs  Code Status:  Full Code     Disposition: possibly    Subjective:     CC: No chief complaint on file. Interval History: no issues. Pain in back and left side improving, mobilizing    ROS: 11 point ROS negative except for back pain. Objective:     Visit Vitals    /73    Pulse 99    Temp 98.2 °F (36.8 °C)    Resp 16    Ht 5' 3\" (1.6 m)    Wt 78 kg (172 lb)    SpO2 94%    BMI 30.47 kg/m2       Temp (24hrs), Av.3 °F (36.8 °C), Min:98.2 °F (36.8 °C), Max:98.5 °F (36.9 °C)        Intake/Output Summary (Last 24 hours) at 17 1318  Last data filed at 17 0830   Gross per 24 hour   Intake              365 ml   Output             1610 ml   Net            -1245 ml       Gen: AOx3, NAD  HEENT:  CHRIS, EOMI. Neck: No Bruits/JVD   Lungs:   CTAB. Good respiratory effort  Heart:   RR S1 S2 without M/R/G  Abdomen: ND,NT, BSX4,   Extremities:   No LE edema. No cyanosis. Skin:  no jaundice/lesions      Data Review:     Meds/Labs/Tests reviewed    Current Shift:  701 -  190  In: 240 [P.O.:240]  Out: -   Last three shifts:  1901 -  0700  In: 965 [P.O.:965]  Out: 3510 [Urine:3510]  Recent Labs      17   0845   HGB  11.1*   HCT  33.8*       No results for input(s): BUN, CREA, CA, ALB, K, NA, CL, CO2, PHOS, GLU in the last 72 hours.      Lab Results   Component Value Date/Time    Glucose 86 08/23/2017 11:11 AM          Care coordination with Nursing/Consultants/staff: 5  Prior history, labs, and charting reviewed: 10    Procedures/Imaging:  No new    Total time spent with chart review, patient examination/education, discussion with staff on case,documentation and medication management / adjustment  :  30 Minutes      Dr Zack Mendenhall  [phone]

## 2017-09-02 NOTE — PROGRESS NOTES
@ 0800 OOB to chair at this time. Family remains at bedside, voices no c/o, call bell in reach. @ 0930  Ambulating in hallway with minimal assist, walker in place. @ 1015. Back to bed, safety measures in place, call bell in reach. Encouraged ambulation. @ 1230 sitting up on side of bed eating lunch. Safety measures in place, call bell in reach    @ 1315 transfer to room 2407 in no distress, family at bedside.

## 2017-09-02 NOTE — PROGRESS NOTES
Daily Progress Note: 2017 1:34 PM   Admit Date: 2017    Patient seen in follow up for multiple medical problems as listed below:  Patient Active Problem List   Diagnosis Code    Lumbar scoliosis M41.9    Lumbar stenosis M48.06    Spondylolisthesis at L4-L5 level M43.16       Assesment     -S/p L2-L3 anterolateral/retroperitoneal interbody fusion, left iliac crest needle aspiration, planned further surgery  diet and mobilization, pain control per NS Team   - HTN: Lisinopril   - Depression: Fluoxetine   - ETOH abuse: CIWA protocol   - Tobacco abuse: Nicoderm      DVT Protocol Active:SCDs  Code Status:  Full Code     Disposition: possibly    Subjective:     CC: No chief complaint on file. Interval History: second surgery on . BP good. No evidence of withdrawl    ROS: 11 point ROS negative except for back pain. Objective:     Visit Vitals    /73    Pulse 99    Temp 98.2 °F (36.8 °C)    Resp 16    Ht 5' 3\" (1.6 m)    Wt 78 kg (172 lb)    SpO2 94%    BMI 30.47 kg/m2       Temp (24hrs), Av.3 °F (36.8 °C), Min:98.2 °F (36.8 °C), Max:98.5 °F (36.9 °C)        Intake/Output Summary (Last 24 hours) at 17 1321  Last data filed at 17 0830   Gross per 24 hour   Intake              365 ml   Output             1610 ml   Net            -1245 ml       Gen: AOx3, NAD  HEENT:  CHRIS, EOMI. Neck: No Bruits/JVD   Lungs:   CTAB. Good respiratory effort  Heart:   RR S1 S2 without M/R/G  Abdomen: ND,NT, BSX4,   Extremities:   No LE edema. No cyanosis. Skin:  no jaundice/lesions      Data Review:     Meds/Labs/Tests reviewed    Current Shift:  701 -  190  In: 240 [P.O.:240]  Out: -   Last three shifts:  1901 -  0700  In: 965 [P.O.:965]  Out: 3510 [Urine:3510]  Recent Labs      17   0845   HGB  11.1*   HCT  33.8*       No results for input(s): BUN, CREA, CA, ALB, K, NA, CL, CO2, PHOS, GLU in the last 72 hours.      Lab Results   Component Value Date/Time    Glucose 86 08/23/2017 11:11 AM          Care coordination with Nursing/Consultants/staff: 5  Prior history, labs, and charting reviewed: 10    Procedures/Imaging:  No new    Total time spent with chart review, patient examination/education, discussion with staff on case,documentation and medication management / adjustment  :  30 Minutes      Dr Maribeth Alpers  [phone]

## 2017-09-02 NOTE — PROGRESS NOTES
1944 Received patient from Omar Min RN. Patient is alert and oriented x 4.   2300 Patient changed position on own. Realized this created a need for pain medication, Treated well with PRN medication. 0300 Patient asleep.  0600 Patient OOB to bathroom ambulating without brace, (reminded patient of Brace). 0700 Bedside and Verbal shift change report given to Titi Miller RN (oncoming nurse) by Frankie Erickson rN (offgoing nurse). Report included the following information SBAR, Kardex, Intake/Output, MAR and Recent Results.

## 2017-09-03 ENCOUNTER — APPOINTMENT (OUTPATIENT)
Dept: GENERAL RADIOLOGY | Age: 67
DRG: 454 | End: 2017-09-03
Attending: INTERNAL MEDICINE
Payer: MEDICARE

## 2017-09-03 PROCEDURE — 94640 AIRWAY INHALATION TREATMENT: CPT

## 2017-09-03 PROCEDURE — 71010 XR CHEST PORT: CPT

## 2017-09-03 PROCEDURE — 74011250637 HC RX REV CODE- 250/637: Performed by: NEUROLOGICAL SURGERY

## 2017-09-03 PROCEDURE — 65270000029 HC RM PRIVATE

## 2017-09-03 PROCEDURE — 74011000250 HC RX REV CODE- 250: Performed by: INTERNAL MEDICINE

## 2017-09-03 PROCEDURE — 74011250637 HC RX REV CODE- 250/637: Performed by: NURSE PRACTITIONER

## 2017-09-03 PROCEDURE — 74011250637 HC RX REV CODE- 250/637: Performed by: INTERNAL MEDICINE

## 2017-09-03 PROCEDURE — 74011250636 HC RX REV CODE- 250/636: Performed by: INTERNAL MEDICINE

## 2017-09-03 PROCEDURE — 74011000272 HC RX REV CODE- 272: Performed by: NEUROLOGICAL SURGERY

## 2017-09-03 RX ORDER — OXYMETAZOLINE HCL 0.05 %
2 SPRAY, NON-AEROSOL (ML) NASAL 2 TIMES DAILY
Status: DISCONTINUED | OUTPATIENT
Start: 2017-09-03 | End: 2017-09-05 | Stop reason: HOSPADM

## 2017-09-03 RX ORDER — ONDANSETRON 4 MG/1
8 TABLET, ORALLY DISINTEGRATING ORAL
Status: DISCONTINUED | OUTPATIENT
Start: 2017-09-03 | End: 2017-09-05 | Stop reason: HOSPADM

## 2017-09-03 RX ORDER — ONDANSETRON 4 MG/1
4 TABLET, ORALLY DISINTEGRATING ORAL
Status: DISCONTINUED | OUTPATIENT
Start: 2017-09-03 | End: 2017-09-03

## 2017-09-03 RX ORDER — ONDANSETRON 2 MG/ML
4 INJECTION INTRAMUSCULAR; INTRAVENOUS
Status: DISCONTINUED | OUTPATIENT
Start: 2017-09-03 | End: 2017-09-03

## 2017-09-03 RX ORDER — ALBUTEROL SULFATE 0.83 MG/ML
2.5 SOLUTION RESPIRATORY (INHALATION)
Status: DISPENSED | OUTPATIENT
Start: 2017-09-03 | End: 2017-09-04

## 2017-09-03 RX ADMIN — ALBUTEROL SULFATE 2.5 MG: 2.5 SOLUTION RESPIRATORY (INHALATION) at 15:03

## 2017-09-03 RX ADMIN — LISINOPRIL 20 MG: 20 TABLET ORAL at 09:05

## 2017-09-03 RX ADMIN — OXYMETAZOLINE HYDROCHLORIDE 2 SPRAY: 5 SPRAY NASAL at 13:06

## 2017-09-03 RX ADMIN — DOCUSATE SODIUM 100 MG: 100 CAPSULE, LIQUID FILLED ORAL at 09:05

## 2017-09-03 RX ADMIN — MINERAL OIL 500 ML: 99.9 LIQUID ORAL; TOPICAL at 11:27

## 2017-09-03 RX ADMIN — POLYETHYLENE GLYCOL (3350) 17 G: 17 POWDER, FOR SOLUTION ORAL at 09:05

## 2017-09-03 RX ADMIN — OXYCODONE HYDROCHLORIDE AND ACETAMINOPHEN 1 TABLET: 5; 325 TABLET ORAL at 21:54

## 2017-09-03 RX ADMIN — DOCUSATE SODIUM 100 MG: 100 CAPSULE, LIQUID FILLED ORAL at 17:14

## 2017-09-03 RX ADMIN — ONDANSETRON 8 MG: 4 TABLET, ORALLY DISINTEGRATING ORAL at 21:54

## 2017-09-03 RX ADMIN — ONDANSETRON 8 MG: 4 TABLET, ORALLY DISINTEGRATING ORAL at 14:15

## 2017-09-03 RX ADMIN — FLUOXETINE 20 MG: 20 CAPSULE ORAL at 09:05

## 2017-09-03 RX ADMIN — BISACODYL 5 MG: 5 TABLET, COATED ORAL at 09:05

## 2017-09-03 RX ADMIN — ALBUTEROL SULFATE 2.5 MG: 2.5 SOLUTION RESPIRATORY (INHALATION) at 19:57

## 2017-09-03 RX ADMIN — OXYMETAZOLINE HYDROCHLORIDE 2 SPRAY: 5 SPRAY NASAL at 17:14

## 2017-09-03 NOTE — PROGRESS NOTES
Bedside and Verbal shift change report given to Zenon Bernal RN (oncoming nurse) by [employer], RN (offgoing nurse). Report included the following information SBAR, Kardex, Procedure Summary and MAR. Patient resting comfortably.

## 2017-09-03 NOTE — PROGRESS NOTES
Daily Progress Note: 9/3/2017 1:34 PM   Admit Date: 2017    Patient seen in follow up for multiple medical problems as listed below:  Patient Active Problem List   Diagnosis Code    Lumbar scoliosis M41.9    Lumbar stenosis M48.06    Spondylolisthesis at L4-L5 level M43.16       Assesment       -S/p L2-L3 anterolateral/retroperitoneal interbody fusion, left iliac crest needle aspiration, planned further surgery  diet and mobilization, pain control per NS Team   - HTN: Lisinopril   - Depression: Fluoxetine   - ETOH abuse: CIWA protocol   - Tobacco abuse: Nicoderm  - rhinitis - afrin      DVT Protocol Active:SCDs  Code Status:  Full Code     Disposition: possibly    Subjective:     CC: No chief complaint on file. Interval History: mild temp elevation, has some SOB, some cough, she is a smoker. Need CXR x1. Add afrin for nasal drainage    ROS: 11 point ROS negative except for back pain and congestion    Objective:     Visit Vitals    /84 (BP 1 Location: Right arm, BP Patient Position: At rest)    Pulse (!) 103    Temp 99.9 °F (37.7 °C)    Resp 14    Ht 5' 3\" (1.6 m)    Wt 78 kg (172 lb)    SpO2 94%    BMI 30.47 kg/m2       Temp (24hrs), Av.3 °F (37.4 °C), Min:98.7 °F (37.1 °C), Max:99.9 °F (37.7 °C)        Intake/Output Summary (Last 24 hours) at 17 1031  Last data filed at 17 0800   Gross per 24 hour   Intake              120 ml   Output              800 ml   Net             -680 ml       Gen: AOx3, NAD  HEENT:  CHRIS, EOMI. Neck: No Bruits/JVD   Lungs:   End exp wheeze. Good respiratory effort  Heart:   RR S1 S2 without M/R/G  Abdomen: ND,NT, BSX4,   Extremities:   No LE edema. No cyanosis.   Skin:  no jaundice/lesions      Data Review:     Meds/Labs/Tests reviewed    Current Shift:  701 - 1900  In: -   Out: 200 [Urine:200]  Last three shifts:  1901 -  0700  In: 360 [P.O.:360]  Out: 1060 [Urine:1060]  Recent Labs      17   0845   HGB 11.1*   HCT  33.8*       No results for input(s): BUN, CREA, CA, ALB, K, NA, CL, CO2, PHOS, GLU in the last 72 hours.      Lab Results   Component Value Date/Time    Glucose 86 08/23/2017 11:11 AM          Care coordination with Nursing/Consultants/staff: 5  Prior history, labs, and charting reviewed: 10    Procedures/Imaging:  No new    Total time spent with chart review, patient examination/education, discussion with staff on case,documentation and medication management / adjustment  :  30 Minutes      Dr Maribeth Alpers  [phone]

## 2017-09-03 NOTE — PROGRESS NOTES
0710 Received bedside report from [facility] and GIOVANY TABARES RN's, updated white board, call bell is within reach. Patient complains of not having a bowel movement for several days at this time. Will speak to the doctor about getting something ordered. 0745 Before speaking with the doctor he put in an order for a hoggs fleet enema. Informed the patient about the enema that is to be order and will wait for the pharmacy to bring up. Patient is out of bed and into the chair at this time. Call bell is within reach. 1010 Patient is back in the bed at this time. Call bell is within reach. Family member is at the bedside. Patient requests to be left alone at this time as she was not able to get any rest at this time. 1000 Spoke with the pharmacy and they stated that they are bringing the enema up. 1130 Patient position on her left side and was given an enema at this time. Patient tolerated the enema well. She has to get up and go to the bathroom immediately afterwards. Instructed the patient to call when she is finished. Family member is in the room at this time. 1200 Patient assisted back to bed by her family member. Patient states she feel much better at this time and would be surprised if she had to go any more today. She feels that it was an adequate amount. Call bell is within reach. 1310 Attempted to give IV zofran to the patient but upon flushing the IV it was no longer working. Patient refused to have new IV placed. Paged Dr. Agata Mayes for an order for Oral Zofran at this time. 1430 Patient given oral Zofran at this time. Was informed patient had another bowel movement. Call bell is within reach. Family member is at the bedside. 1600 Patient states she is feel much better after having about four bowel movements. She is requesting to have ginger ale and crackers. Call bell is within reach. Family member is at the bedside. 1815 Patient is sleeping in the bed at this time.  Call Vandana Cueto is within reach. Bedside and Verbal shift change report given to Cee Monique RN (oncoming nurse) by Arturo Cervantes RN (offgoing nurse). Report included the following information SBAR, Kardex and MAR.

## 2017-09-03 NOTE — PROGRESS NOTES
Neurosurgery Progress Note; post op day 2 and 3  S: doing well patient is sore but minimal residual leg pain voiding well no bm, disposition will be to [facility]   O;  hct 33.8% afebrile and vitals are stable.  Productive cough multiple wounds all are bruised but look good function is 5.5  A; good progress  P;  Toshia enema today, transfer in 1-2 days

## 2017-09-03 NOTE — PROGRESS NOTES
Per MD note pt will be ready for discharge in 1-2 days. Pt has bed at [facility] when ready for discharge. See prev. CM note for contact #.

## 2017-09-03 NOTE — PROGRESS NOTES
1510: Pt awake on room air. Clear breath sounds heard. No respiratory distress observed. Pt had just finished using bathroom after being given an enema. Scheduled Albuterol treatment given via aerosol mask. No adverse effects observed.

## 2017-09-03 NOTE — PROGRESS NOTES
Problem: Mobility Impaired (Adult and Pediatric)  Goal: *Acute Goals and Plan of Care (Insert Text)  Physical Therapy Goals  Initiated 9/1/2017 and to be accomplished within 7 day(s)  1. Patient will move from supine to sit and sit to supine , scoot up and down and roll side to side in bed with modified independence. 2. Patient will transfer from bed to chair and chair to bed with modified independence using the least restrictive device. 3. Patient will perform sit to stand with modified independence. 4. Patient will ambulate with modified independence for 300 feet with the least restrictive device. 5. Patient will ascend/descend 3 stairs with handrail(s) with modified independence. PHYSICAL THERAPY TREATMENT     Patient: Ronnell Davila (83 y.o. female)  Date: 9/3/2017  Diagnosis: lumbar stenosis/stenosis/unstable spondylolisthesis  m41.9,m43.16,m48.06  Lumbar scoliosis  lumbar stenosis unstable sponsylilthesis   m41.9,m43.16,m48.06 Lumbar scoliosis  Procedure(s) (LRB):  l2 to s1 percutaneous screw fixation,  (N/A)  l4-5, l5-s1 minimally invasive TRANSFORAMINAL LUMBAR INTERBODY FUSION (N/A) 3 Days Post-Op  Precautions: Fall, Back, Spinal  Chart, physical therapy assessment, plan of care and goals were reviewed. Pt declined PT x 2 due to enema causing frequent BM. Note pt amb to the bathroom (I).           Suze Augustin PTA

## 2017-09-03 NOTE — PROGRESS NOTES
Pt  Awake laying supine in bed,after assessment completed pt was assisted with help of walker she ambulated to bathroom to try if she could have bowel movement but did not have any. Pt had episode of  Small  amount of emesis undigested food. Yeny Adie in color. Pt ambulated on dom[address] way and then back to bed said she wanted to sleep.

## 2017-09-03 NOTE — PROGRESS NOTES
Bedside and Verbal shift change report given to Carolina/Sister Syeda RN (oncoming nurse) by Jhony Hoffman RN (offgoing nurse). Report included the following information SBAR, Kardex and MAR. Assessment completed. Patient denies any pain at this time. Bed locked and at lowest position. Call bell within reach. Patient ambulated to the bathroom using a walker with RN assistance; activity tolerated well.

## 2017-09-04 PROCEDURE — 74011250637 HC RX REV CODE- 250/637: Performed by: NEUROLOGICAL SURGERY

## 2017-09-04 PROCEDURE — 74011250637 HC RX REV CODE- 250/637: Performed by: NURSE PRACTITIONER

## 2017-09-04 PROCEDURE — 97530 THERAPEUTIC ACTIVITIES: CPT

## 2017-09-04 PROCEDURE — 74011000250 HC RX REV CODE- 250: Performed by: INTERNAL MEDICINE

## 2017-09-04 PROCEDURE — 94640 AIRWAY INHALATION TREATMENT: CPT

## 2017-09-04 PROCEDURE — 74011250637 HC RX REV CODE- 250/637: Performed by: INTERNAL MEDICINE

## 2017-09-04 PROCEDURE — 65270000029 HC RM PRIVATE

## 2017-09-04 RX ADMIN — OXYCODONE HYDROCHLORIDE AND ACETAMINOPHEN 2 TABLET: 5; 325 TABLET ORAL at 21:26

## 2017-09-04 RX ADMIN — OXYCODONE HYDROCHLORIDE AND ACETAMINOPHEN 1 TABLET: 5; 325 TABLET ORAL at 09:12

## 2017-09-04 RX ADMIN — OXYMETAZOLINE HYDROCHLORIDE 2 SPRAY: 5 SPRAY NASAL at 19:17

## 2017-09-04 RX ADMIN — ALBUTEROL SULFATE 2.5 MG: 2.5 SOLUTION RESPIRATORY (INHALATION) at 02:18

## 2017-09-04 RX ADMIN — DOCUSATE SODIUM 100 MG: 100 CAPSULE, LIQUID FILLED ORAL at 19:09

## 2017-09-04 RX ADMIN — BISACODYL 5 MG: 5 TABLET, COATED ORAL at 08:50

## 2017-09-04 RX ADMIN — LISINOPRIL 20 MG: 20 TABLET ORAL at 08:50

## 2017-09-04 RX ADMIN — FLUOXETINE 20 MG: 20 CAPSULE ORAL at 08:50

## 2017-09-04 RX ADMIN — DOCUSATE SODIUM 100 MG: 100 CAPSULE, LIQUID FILLED ORAL at 08:50

## 2017-09-04 NOTE — PROGRESS NOTES
Problem: Falls - Risk of  Goal: *Absence of Falls  Document Jono Fall Risk and appropriate interventions in the flowsheet.    Outcome: Progressing Towards Goal  Fall Risk Interventions:  Mobility Interventions: Bed/chair exit alarm, Patient to call before getting OOB, PT Consult for mobility concerns, PT Consult for assist device competence           Medication Interventions: Patient to call before getting OOB, Teach patient to arise slowly     Elimination Interventions: Call light in reach, Patient to call for help with toileting needs, Toileting schedule/hourly rounds

## 2017-09-04 NOTE — PROGRESS NOTES
completed follow up visit with patient and a Spiritual assessment of patient was done. Patient is doing OK today but says yesterday that things were not so sweet.   Chaplains will continue to follow and will provide pastoral care on an as needed/requested basis    Chaplain Simone Salas   Board Certified [address] [place] [address]   [phone]

## 2017-09-04 NOTE — PROGRESS NOTES
Daily Progress Note: 2017 1:34 PM   Admit Date: 2017    Patient seen in follow up for multiple medical problems as listed below:  Patient Active Problem List   Diagnosis Code    Lumbar scoliosis M41.9    Lumbar stenosis M48.06    Spondylolisthesis at L4-L5 level M43.16       Assesment       -S/p L2-L3 anterolateral/retroperitoneal interbody fusion, left iliac crest needle aspiration, planned further surgery  diet and mobilization, pain control per NS Team   - HTN: Lisinopril   - Depression: Fluoxetine   - ETOH abuse: CIWA protocol   - Tobacco abuse: Nicoderm  - rhinitis - afrin      DVT Protocol Active:SCDs  Code Status:  Full Code     Disposition: possibly    Subjective:     CC: No chief complaint on file. Interval History: mild temp elevation, has some SOB, some cough, she is a smoker. Need CXR x1. Add afrin for nasal drainage    ROS: 11 point ROS negative except for back pain and congestion    Objective:     Visit Vitals    /74 (BP 1 Location: Right arm, BP Patient Position: At rest)    Pulse (!) 102    Temp 97.9 °F (36.6 °C)    Resp 14    Ht 5' 3\" (1.6 m)    Wt 78 kg (172 lb)    SpO2 94%    BMI 30.47 kg/m2       Temp (24hrs), Av.2 °F (36.8 °C), Min:97.9 °F (36.6 °C), Max:98.4 °F (36.9 °C)        Intake/Output Summary (Last 24 hours) at 17 1130  Last data filed at 17 0849   Gross per 24 hour   Intake              580 ml   Output                0 ml   Net              580 ml       Gen: AOx3, NAD  HEENT:  CHRIS, EOMI. Neck: No Bruits/JVD   Lungs:   End exp wheeze. Good respiratory effort  Heart:   RR S1 S2 without M/R/G  Abdomen: ND,NT, BSX4,   Extremities:   No LE edema. No cyanosis.   Skin:  no jaundice/lesions      Data Review:     Meds/Labs/Tests reviewed    Current Shift:  701 - 1900  In: 240 [P.O.:240]  Out: -   Last three shifts:  1901 -  0700  In: 18 [P.O.:510]  Out: 800 [Urine:800]  Recent Labs      17   0845   HGB 11.1*   HCT  33.8*       No results for input(s): BUN, CREA, CA, ALB, K, NA, CL, CO2, PHOS, GLU in the last 72 hours.      Lab Results   Component Value Date/Time    Glucose 86 08/23/2017 11:11 AM          Care coordination with Nursing/Consultants/staff: 5  Prior history, labs, and charting reviewed: 10    Procedures/Imaging:  No new    Total time spent with chart review, patient examination/education, discussion with staff on case,documentation and medication management / adjustment  :  30 Minutes      [address]  [phone]

## 2017-09-04 NOTE — ROUTINE PROCESS
Bedside and Verbal shift change report given to CATHERINE Crowder (oncoming nurse) by Sulema Lima RN (offgoing nurse). Report included the following information SBAR, Kardex and MAR. Patient had no acute events overnight. Currently resting in NAD. No express needs reported at this time.

## 2017-09-04 NOTE — PROGRESS NOTES
Problem: Mobility Impaired (Adult and Pediatric)  Goal: *Acute Goals and Plan of Care (Insert Text)  Physical Therapy Goals  Initiated 9/1/2017 and to be accomplished within 7 day(s)  1. Patient will move from supine to sit and sit to supine , scoot up and down and roll side to side in bed with modified independence. 2. Patient will transfer from bed to chair and chair to bed with modified independence using the least restrictive device. 3. Patient will perform sit to stand with modified independence. 4. Patient will ambulate with modified independence for 300 feet with the least restrictive device. 5. Patient will ascend/descend 3 stairs with handrail(s) with modified independence. Outcome: Progressing Towards Goal  PHYSICAL THERAPY TREATMENT     Patient: Dianna Odonnell (77 y.o. female)  Date: 9/4/2017  Diagnosis: lumbar stenosis/stenosis/unstable spondylolisthesis  m41.9,m43.16,m48.06  Lumbar scoliosis  lumbar stenosis unstable sponsylilthesis   m41.9,m43.16,m48.06 Lumbar scoliosis  Procedure(s) (LRB):  l2 to s1 percutaneous screw fixation,  (N/A)  l4-5, l5-s1 minimally invasive TRANSFORAMINAL LUMBAR INTERBODY FUSION (N/A) 4 Days Post-Op  Precautions: DNI, Back, Spinal  Chart, physical therapy assessment, plan of care and goals were reviewed. ASSESSMENT:  Patient in bed when PT arrived and agreed to therapy. Worked on proper technique in bed mobility, don/doff back brace and ambulation with rolling walker needing cues for proper procedure. gait ambulating 40 feet at one time needing to rest in standing. Patient reports getting pain meds and pain 1-2 /10 pre and post. education on safety and proper technique with mobility needing  Reinforcement. Left in bed for spa bath.      Progression toward goals:  [ ]      Improving appropriately and progressing toward goals  [X]      Improving slowly and progressing toward goals  [ ]      Not making progress toward goals and plan of care will be adjusted PLAN:  Patient continues to benefit from skilled intervention to address the above impairments. Continue treatment per established plan of care. Discharge Recommendations:  Rehab and [facility]  Further Equipment Recommendations for Discharge:  rolling walker       SUBJECTIVE:   Patient stated .      OBJECTIVE DATA SUMMARY:   Critical Behavior:  Neurologic State: Alert  Orientation Level: Oriented X4  Cognition: Follows commands  Safety/Judgement: Fall prevention  Functional Mobility Training:  Bed Mobility:  Supine to Sit: Contact guard assistance;Minimum assistance; Additional time  Sit to Supine: Contact guard assistance;Minimum assistance; Additional time;Assist x1  Transfers:  Sit to Stand: Contact guard assistance;Minimum assistance; Additional time;Assist x1  Stand to Sit: Contact guard assistance;Minimum assistance; Additional time;Assist x1  Balance:  Sitting: Impaired  Sitting - Static: Good (unsupported); Fair (occasional)  Sitting - Dynamic: Fair (occasional)  Standing: Impaired  Standing - Static: Fair;Good  Standing - Dynamic : Fair  Ambulation/Gait Training:  Distance (ft): 40 Feet (ft) (x4)  Assistive Device: Brace/Splint; Walker, rolling  Ambulation - Level of Assistance: Contact guard assistance; Additional time  Gait Abnormalities: Antalgic;Decreased step clearance; Step to gait  Base of Support: Center of gravity altered  Speed/Janice: Slow  Step Length: Left shortened;Right shortened  Interventions: Safety awareness training;Verbal cues; Visual/Demos     Therapeutic Exercises:   ankle pumps glut sets   Pain:1/2  Pain Scale 1: Numeric (0 - 10)  Activity Tolerance:   Fair   Please refer to the flowsheet for vital signs taken during this treatment.   After treatment:   [ ] Patient left in no apparent distress sitting up in chair  [X] Patient left in no apparent distress in bed  [X] Call bell left within reach  [X] Nursing notified  [ ] Caregiver present  [ ] Bed alarm activated Rocael Damon, PT   Time Calculation: 23 mins

## 2017-09-04 NOTE — PROGRESS NOTES
6782  Received patient with no IV access, patient sitting in chair, call bell within reach, no acute distress noted    1855 pain medication offered, patient refused, stating \"I will take my pain medication tonight about pm.\" Patient instructed to notify  Nurse if pain medication is wanted before pm. Patient verbalized understanding. Assisted patient to bed, no acute distress noted     1910  Bedside and Verbal shift change report given to Martínez (oncoming nurse) by April Liz RN   (offgoing nurse). Report included the following information SBAR, Kardex and MAR.

## 2017-09-04 NOTE — PROGRESS NOTES
Problem: Falls - Risk of  Goal: *Absence of Falls  Document Jono Fall Risk and appropriate interventions in the flowsheet.    Outcome: Progressing Towards Goal  Fall Risk Interventions:  Mobility Interventions: Patient to call before getting OOB           Medication Interventions: Patient to call before getting OOB, Teach patient to arise slowly     Elimination Interventions: Call light in reach

## 2017-09-04 NOTE — PROGRESS NOTES
Neurosurgery Progress Note; post op day 6&4  S: doing well patient is sore but minimal residual leg pain voiding well ; ileus resolving   O;  hct 33.8% afebrile and vitals are stable.  Productive cough multiple wounds all are bruised but look good function is 5.5  A; good progress  P;  Transfer tomorrow

## 2017-09-05 VITALS
TEMPERATURE: 98 F | HEIGHT: 63 IN | BODY MASS INDEX: 30.48 KG/M2 | RESPIRATION RATE: 16 BRPM | OXYGEN SATURATION: 95 % | DIASTOLIC BLOOD PRESSURE: 67 MMHG | WEIGHT: 172 LBS | SYSTOLIC BLOOD PRESSURE: 109 MMHG | HEART RATE: 75 BPM

## 2017-09-05 PROCEDURE — 74011250637 HC RX REV CODE- 250/637: Performed by: NEUROLOGICAL SURGERY

## 2017-09-05 PROCEDURE — 74011250637 HC RX REV CODE- 250/637: Performed by: INTERNAL MEDICINE

## 2017-09-05 PROCEDURE — 97535 SELF CARE MNGMENT TRAINING: CPT

## 2017-09-05 PROCEDURE — 97116 GAIT TRAINING THERAPY: CPT

## 2017-09-05 PROCEDURE — 74011250637 HC RX REV CODE- 250/637: Performed by: NURSE PRACTITIONER

## 2017-09-05 RX ORDER — OXYCODONE AND ACETAMINOPHEN 5; 325 MG/1; MG/1
1 TABLET ORAL
Qty: 40 TAB | Refills: 0 | Status: SHIPPED | OUTPATIENT
Start: 2017-09-05 | End: 2017-09-12

## 2017-09-05 RX ORDER — BISACODYL 5 MG
5 TABLET, DELAYED RELEASE (ENTERIC COATED) ORAL DAILY
Qty: 30 TAB | Refills: 1 | Status: SHIPPED | OUTPATIENT
Start: 2017-09-05

## 2017-09-05 RX ORDER — FEXOFENADINE HCL AND PSEUDOEPHEDRINE HCI 180; 240 MG/1; MG/1
1 TABLET, EXTENDED RELEASE ORAL
Qty: 30 TAB | Refills: 0 | Status: SHIPPED | OUTPATIENT
Start: 2017-09-05

## 2017-09-05 RX ORDER — METHOCARBAMOL 750 MG/1
750 TABLET, FILM COATED ORAL
Qty: 40 TAB | Refills: 0 | Status: SHIPPED | OUTPATIENT
Start: 2017-09-05

## 2017-09-05 RX ADMIN — DOCUSATE SODIUM 100 MG: 100 CAPSULE, LIQUID FILLED ORAL at 09:25

## 2017-09-05 RX ADMIN — FLUOXETINE 20 MG: 20 CAPSULE ORAL at 09:24

## 2017-09-05 RX ADMIN — OXYCODONE HYDROCHLORIDE AND ACETAMINOPHEN 1 TABLET: 5; 325 TABLET ORAL at 06:02

## 2017-09-05 RX ADMIN — BISACODYL 5 MG: 5 TABLET, COATED ORAL at 09:25

## 2017-09-05 RX ADMIN — LISINOPRIL 20 MG: 20 TABLET ORAL at 09:25

## 2017-09-05 NOTE — DISCHARGE SUMMARY
Discharge Summary     PATIENT ID:  Aracely Martinez  78 y.o.  1950    ADMITTING PHYSICIAN:  Tyesha Pereyra MD, MD  ADMIT DATE: 8/29/2017   DISCHARGE DATE: 09/05/17       DISCHARGE DIAGNOSIS: Scoliosis, unstable spondylolisthesis, stenosis    OPERATIVE PROCEDURES: L2 to ilium interbody fusion and decompression        HOSPITAL COURSE:  Tolerated the operative procedure well and was taken to PACU and then to the floor. Slow to mobilize initially. Had ileus which resolved and had BM after enema. Denies LE pain, paresthesias. Has approp iliopsoas weakness. As of d/c the patient is ambulating, tolerating p.o., and voiding without difficulty. PHYSICAL EXAM:  Visit Vitals    /67 (BP 1 Location: Left arm, BP Patient Position: At rest)    Pulse 75    Temp 98 °F (36.7 °C)    Resp 16    Ht 5' 3\" (1.6 m)    Wt 78 kg (172 lb)    SpO2 95%    BMI 30.47 kg/m2     Alert and appropriate. Motor and sensory function are grossly intact save mild (5+/5 HF on left). The wounds are clean/dry/intact and flat; stable periincisional bruising. Thighs and calves non-tender to deep palpation. RELAVENT LABS ( within last 24 hrs):    No results found for this or any previous visit (from the past 24 hour(s)). CONDITION AT DISCHARGE: Afrebrile  Ambulating  Eating, Drinking, Voiding  Improved  Moving bowels  Pain control acceptable  Stable  Wound clean, healing well    Current Discharge Medication List      START taking these medications    Details   bisacodyl (DULCOLAX) 5 mg EC tablet Take 1 Tab by mouth daily. Qty: 30 Tab, Refills: 1      methocarbamol (ROBAXIN) 750 mg tablet Take 1 Tab by mouth four (4) times daily as needed. Indications: MUSCLE SPASM  Qty: 40 Tab, Refills: none      oxyCODONE-acetaminophen (PERCOCET) 5-325 mg per tablet Take 1 Tab by mouth every four (4) hours as needed for up to 7 days. Max Daily Amount: 6 Tabs.  Indications: Pain  Qty: 40 Tab, Refills: none fexofenadine-pseudoephedrine (ALLEGRA-D 24) 180-240 mg per tablet Take 1 Tab by mouth daily as needed. Indications: ALLERGIC RHINITIS  Qty: 30 Tab, Refills: none         CONTINUE these medications which have NOT CHANGED    Details   lisinopril (PRINIVIL, ZESTRIL) 20 mg tablet Take 1 Tab by mouth daily. Refills: 0      ENBREL SURECLICK 50 mg/mL (4.93 mL) injection 50 mg by Injection route every seven (7) days. Refills: 0      FLUoxetine (PROZAC) 20 mg capsule Take 1 Cap by mouth daily. Refills: 0               DISPOSITION:  SNF/rehab   -F/u 4 weeks (the patient should call for the appointment)   -Activity instructions have been given   - No bending, lifting (more than a jug of milk), or twisting. Brace on when out of bed (may be off to shower). May shower now, NO bathing or submerging of the wound. No driving. Verbal instructions given and understanding displayed. No NSAIDS (aleve, ibuprofen, aspirin, etc) until cleared by me.     CONSULTANTS:  [employer]       PCP:  Vikas Denny MD, MD    DISCHARGING PHYSICIAN: Jamilah Nova MD, MD

## 2017-09-05 NOTE — DISCHARGE INSTRUCTIONS
DISCHARGE SUMMARY from Nurse    The following personal items are in your possession at time of discharge:    Dental Appliances: None  Visual Aid: With patient, Glasses     Home Medications: None  Jewelry: None  Clothing:  (everything kept in pt room, )  Other Valuables: Brace, Eyeglasses             PATIENT INSTRUCTIONS:    After general anesthesia or intravenous sedation, for 24 hours or while taking prescription Narcotics:  · Limit your activities  · Do not drive and operate hazardous machinery  · Do not make important personal or business decisions  · Do  not drink alcoholic beverages  · If you have not urinated within 8 hours after discharge, please contact your surgeon on call. Report the following to your surgeon:  · Excessive pain, swelling, redness or odor of or around the surgical area  · Temperature over 100.5  · Nausea and vomiting lasting longer than 4 hours or if unable to take medications  · Any signs of decreased circulation or nerve impairment to extremity: change in color, persistent  numbness, tingling, coldness or increase pain  · Any questions        What to do at Home:  Recommended activity: No bending, lifting, twisting or driving until cleared by surgeon. May shower, but do not submerge wounds in water. Do not take NSAIDS ( aleve, aspirin, ibuprofen, etc.) until ok with Dr. Stephanie David. Wear brace when out of bed. If you experience any of the following symptoms severe pain, nausea and vomiting, fever above 100.5, bleeding or drainage from incision, shortness of breath, please follow up with Dr. Stephanie David. *  Please give a list of your current medications to your Primary Care Provider. *  Please update this list whenever your medications are discontinued, doses are      changed, or new medications (including over-the-counter products) are added. *  Please carry medication information at all times in case of emergency situations.           These are general instructions for a healthy lifestyle:    No smoking/ No tobacco products/ Avoid exposure to second hand smoke    Surgeon General's Warning:  Quitting smoking now greatly reduces serious risk to your health. Obesity, smoking, and sedentary lifestyle greatly increases your risk for illness    A healthy diet, regular physical exercise & weight monitoring are important for maintaining a healthy lifestyle    You may be retaining fluid if you have a history of heart failure or if you experience any of the following symptoms:  Weight gain of 3 pounds or more overnight or 5 pounds in a week, increased swelling in our hands or feet or shortness of breath while lying flat in bed. Please call your doctor as soon as you notice any of these symptoms; do not wait until your next office visit. Recognize signs and symptoms of STROKE:    F-face looks uneven    A-arms unable to move or move unevenly    S-speech slurred or non-existent    T-time-call 911 as soon as signs and symptoms begin-DO NOT go       Back to bed or wait to see if you get better-TIME IS BRAIN. Warning Signs of HEART ATTACK     Call 911 if you have these symptoms:   Chest discomfort. Most heart attacks involve discomfort in the center of the chest that lasts more than a few minutes, or that goes away and comes back. It can feel like uncomfortable pressure, squeezing, fullness, or pain.  Discomfort in other areas of the upper body. Symptoms can include pain or discomfort in one or both arms, the back, neck, jaw, or stomach.  Shortness of breath with or without chest discomfort.  Other signs may include breaking out in a cold sweat, nausea, or lightheadedness. Don't wait more than five minutes to call 911 - MINUTES MATTER! Fast action can save your life. Calling 911 is almost always the fastest way to get lifesaving treatment. Emergency Medical Services staff can begin treatment when they arrive -- up to an hour sooner than if someone gets to the hospital by car.        The discharge information has been reviewed with the patient. The patient verbalized understanding. Discharge medications reviewed with the patient and appropriate educational materials and side effects teaching were provided.   Patient armband removed and shredded

## 2017-09-05 NOTE — PROGRESS NOTES
Care Management Interventions  PCP Verified by CM:  Yes  Mode of Transport at Discharge: BLS  Transition of Care Consult (CM Consult): SNF  Partner SNF: No  Reason Why Partner SNF Not Chosen: Location  MyChart Signup: No  Discharge Durable Medical Equipment: No  Physical Therapy Consult: Yes  Occupational Therapy Consult: Yes  Speech Therapy Consult: No  Current Support Network: Lives Alone  Confirm Follow Up Transport: Other (see comment)  Plan discussed with Pt/Family/Caregiver: Yes  Freedom of Choice Offered: Yes  Discharge Location  Discharge Placement: Skilled nursing facility

## 2017-09-05 NOTE — PROGRESS NOTES
1940 Received patient from Cheryl Fernandez RN. Patient is alert and oriented x 4.   2300 Patient resting with eyes closed noted that call bell within reach. 0300 Patient Got up to restroom with assistance. No episodes. 0292 Bedside and Verbal shift change report given to Cheryl Fernandez RN (oncoming nurse) by Kayley Augustin RN (offgoing nurse). Report included the following information SBAR, Intake/Output, MAR and Recent Results.

## 2017-09-05 NOTE — PROGRESS NOTES
5552 patient made aware of discharge order, am meds given, call bell within reach, No acute distress noted,     1251 calling to give report , left message including unit telephone number and cellphone number, awaiting for call back     1313 report Cordelia Arambula transport at bedside. Discharge instruction given by Haydee Gore. Brace in place.

## 2017-09-05 NOTE — PROGRESS NOTES
Patient to discharge to [facility] today. PCS form and check list to nurses station. Prescriptions and  Discharge summary faxed. DC summary in e discharge. Patient aware of discharge time and transportation arrangements.   Elvia Mireles RN

## 2017-09-05 NOTE — PROGRESS NOTES
Problem: Self Care Deficits Care Plan (Adult)  Goal: *Acute Goals and Plan of Care (Insert Text)  Occupational Therapy Goals  Initiated 9/1/2017 within 7 day(s). 1. Patient will perform functional task in standing for 5 minutes with supervision for balance to increase activity tolerance for ADLs. 2. Patient will perform lower body dressing with modified independence utilizing AE, prn.  3. Patient will perform grooming tasks while standing with supervision for balance. 4. Patient will perform toilet transfers with modified independence. 5. Patient will perform all aspects of toileting with modified independence. 6. Patient will participate in upper extremity therapeutic exercise/activities with supervision/set-up for 8 minutes to increase BUE strength for ADLs. 7. Patient will utilize energy conservation techniques during functional activities with minimal verbal cues. Outcome: Resolved/Met Date Met:  09/05/17  OCCUPATIONAL THERAPY TREATMENT/DISCHARGE     Patient: Kavya Reeves (60 y.o. female)  Date: 9/5/2017  Diagnosis: lumbar stenosis/stenosis/unstable spondylolisthesis  m41.9,m43.16,m48.06  Lumbar scoliosis  lumbar stenosis unstable sponsylilthesis   m41.9,m43.16,m48.06 Lumbar scoliosis  Procedure(s) (LRB):  l2 to s1 percutaneous screw fixation,  (N/A)  l4-5, l5-s1 minimally invasive TRANSFORAMINAL LUMBAR INTERBODY FUSION (N/A) 5 Days Post-Op  Precautions: Fall, Back  Chart, occupational therapy assessment, plan of care, and goals were reviewed. ASSESSMENT: Pt up in chair on arrival; c/o minimal back pain. Agreeable to therapy. Pt has discharge orders; leaving for rehab today. Pt provided with skilled instruction on use of reacher to facilitate increased independence in ADLs. Donned underwear and elastic waist pants utilizing reacher and skilled instruction on back precautions and adaptive strategies. SBA/supervision to stand & manage underwear/pants over hips.  Supervision to don slip-on shoes, bra, and overhead shirt. Supervision/mod I to don LSO. Pt left sitting in chair with needs within reach. Sister at bedside. Pt has met acute care goals; pt will benefit from rehab to maximize independence in ADLs, higher level ADLs, and functional mobility. EDUCATION: Pt educated on back precautions, adaptive strategies, and activity pacing. Progression toward goals:  [X]          Improving appropriately and progressing toward goals  [ ]          Improving slowly and progressing toward goals  [ ]          Not making progress toward goals and plan of care will be adjusted          PLAN:  Patient will be discharged from occupational therapy at this time. Rationale for discharge:  [X] Goals Achieved  [ ] Liddie Begin  [ ] Patient not participating in therapy  [ ] Other:  Discharge Recommendations:  Rehab  Further Equipment Recommendations for Discharge:  bedside commode       SUBJECTIVE:   Patient stated       OBJECTIVE DATA SUMMARY:   G CODES: Self Care  Current  CI= 1-19%   Goal  CI= 1-19%.   The severity rating is based on the Other Functional Assessment, MMT, ROM     Cognitive/Behavioral Status:  Neurologic State: Alert  Orientation Level: Oriented X4  Cognition: Appropriate decision making, Appropriate for age attention/concentration, Follows commands  Safety/Judgement: Awareness of environment, Fall prevention  Functional Mobility and Transfers for ADLs:              Bed Mobility:  Rolling: Stand-by asssistance;Contact guard assistance  Supine to Sit:  (not assessed; pt up in chair on arrival)  Sit to Supine:  (pt left up in chair)                 Transfers:  Sit to Stand: Stand-by asssistance (vc's for BUE positioning)     Balance:  Sitting: Impaired  Sitting - Static: Good (unsupported)  Sitting - Dynamic: Fair (occasional)  Standing: Impaired  Standing - Static: Fair;Good  Standing - Dynamic : Fair      ADL Intervention:  Upper Body Dressing Assistance  Dressing Assistance: Stand-by assistance  Bra: Stand-by assistance (vc's)  Pullover Shirt: Supervision/set-up  Cues: Verbal cues provided     Lower Body Dressing Assistance  Dressing Assistance: Minimum assistance  Underpants: Minimum assistance  Pants With Elastic Waist: Minimum assistance  Socks: Minimum assistance  Slip on Shoes Without Back: Supervision/set-up  Leg Crossed Method Used: No  Position Performed: Seated in chair  Cues: Physical assistance;Verbal cues provided  Adaptive Equipment Used: Reacher     Pt provided with skilled instruction on use of reacher to facilitate increased independence in ADLs. Donned underwear and elastic waist pants utilizing reacher and skilled instruction on back precautions and adaptive strategies. SBA/supervision to don slip-on shoes, bra, and overhead shirt. Cognitive Retraining  Safety/Judgement: Awareness of environment; Fall prevention     Pain:  Pre treatment pain:  2/10  Post treatment pain: 2/10  Pain Scale 1: Numeric (0 - 10)  Pain Location 1: Back  Pain Orientation 1: Posterior  Pain Description 1: Aching;Burning  Pain Intervention(s) 1: Medication (see MAR)     Activity Tolerance:  Fair+  Please refer to the flowsheet for vital signs taken during this treatment.   After treatment:   [X]  Patient left in no apparent distress sitting up in chair  [ ]  Patient left in no apparent distress in bed  [X]  Call bell left within reach  [X]  Nursing notified  [X]  Caregiver present  [ ]  Bed alarm activated     Ari Rucker MS OTAVINASH/CUCA  Time Calculation: 15 mins

## 2017-09-05 NOTE — PROGRESS NOTES
Daily Progress Note: 2017 1:34 PM   Admit Date: 2017    Patient seen in follow up for multiple medical problems as listed below:  Patient Active Problem List   Diagnosis Code    Lumbar scoliosis M41.9    Lumbar stenosis M48.06    Spondylolisthesis at L4-L5 level M43.16       Assesment       -S/p L2-L3 anterolateral/retroperitoneal interbody fusion, left iliac crest needle aspiration  - HTN: Lisinopril   - Depression: Fluoxetine   - ETOH abuse: CIWA protocol   - Tobacco abuse: Nicoderm  - rhinitis - afrin  -Pt clinically stable for discharge. DVT Protocol Active:SCDs  Code Status:  Full Code     Disposition: possibly    Subjective:     CC: No chief complaint on file. Interval History: mild temp elevation, has some SOB, some cough, she is a smoker. Need CXR x1. Add afrin for nasal drainage    ROS: 11 point ROS negative except for back pain and congestion    Objective:     Visit Vitals    /67 (BP 1 Location: Left arm, BP Patient Position: At rest)    Pulse 75    Temp 98 °F (36.7 °C)    Resp 16    Ht 5' 3\" (1.6 m)    Wt 78 kg (172 lb)    SpO2 95%    BMI 30.47 kg/m2       Temp (24hrs), Av.4 °F (36.9 °C), Min:98 °F (36.7 °C), Max:98.8 °F (37.1 °C)        Intake/Output Summary (Last 24 hours) at 17 1215  Last data filed at 17 0930   Gross per 24 hour   Intake              720 ml   Output                0 ml   Net              720 ml       Gen: AOx3, NAD  HEENT:  CHRIS, EOMI. Neck: No Bruits/JVD   Lungs:   End exp wheeze. Good respiratory effort  Heart:   RR S1 S2 without M/R/G  Abdomen: ND,NT, BSX4,   Extremities:   No LE edema. No cyanosis.   Skin:  no jaundice/lesions      Data Review:     Meds/Labs/Tests reviewed    Current Shift:  701 - 1900  In: 240 [P.O.:240]  Out: -   Last three shifts:  1901 -  0700  In: 840 [P.O.:840]  Out: 250 [Urine:250]  No results for input(s): WBC, RBC, HGB, HCT, PLT, GRANS, LYMPH, EOS, HGBEXT, HCTEXT, PLTEXT, HGBEXT, HCTEXT, PLTEXT in the last 72 hours. No results for input(s): BUN, CREA, CA, ALB, K, NA, CL, CO2, PHOS, GLU in the last 72 hours.      Lab Results   Component Value Date/Time    Glucose 86 08/23/2017 11:11 AM        Procedures/Imaging:  No new      [facility]  Hospitalist Division  [phone]

## 2017-09-05 NOTE — PROGRESS NOTES
Problem: Falls - Risk of  Goal: *Absence of Falls  Document Jono Fall Risk and appropriate interventions in the flowsheet.    Outcome: Progressing Towards Goal  Fall Risk Interventions:  Mobility Interventions: Patient to call before getting OOB           Medication Interventions: Patient to call before getting OOB, Teach patient to arise slowly     Elimination Interventions: Call light in reach, Patient to call for help with toileting needs

## 2017-09-05 NOTE — PROGRESS NOTES
Problem: Mobility Impaired (Adult and Pediatric)  Goal: *Acute Goals and Plan of Care (Insert Text)  Physical Therapy Goals  Initiated 9/1/2017 and to be accomplished within 7 day(s)  1. Patient will move from supine to sit and sit to supine , scoot up and down and roll side to side in bed with modified independence. 2. Patient will transfer from bed to chair and chair to bed with modified independence using the least restrictive device. 3. Patient will perform sit to stand with modified independence. 4. Patient will ambulate with modified independence for 300 feet with the least restrictive device. 5. Patient will ascend/descend 3 stairs with handrail(s) with modified independence. PHYSICAL THERAPY TREATMENT     Patient: Aracely Martinez (28 y.o. female)  Date: 9/5/2017  Diagnosis: lumbar stenosis/stenosis/unstable spondylolisthesis  m41.9,m43.16,m48.06  Lumbar scoliosis  lumbar stenosis unstable sponsylilthesis   m41.9,m43.16,m48.06 Lumbar scoliosis  Procedure(s) (LRB):  l2 to s1 percutaneous screw fixation,  (N/A)  l4-5, l5-s1 minimally invasive TRANSFORAMINAL LUMBAR INTERBODY FUSION (N/A) 5 Days Post-Op  Precautions: Fall, Back  Chart, physical therapy assessment, plan of care and goals were reviewed. ASSESSMENT:  patient in bed with brace and came to sitting needing cues for correct technique of log rolling. Ambulated with rolling walker and cga/supervision for gait  Needing cues for safety and upright posture. Education needing cues for safety,pain reported 4/10 pre and post. Left in bed with call light in reach. Progression toward goals:  [ ]      Improving appropriately and progressing toward goals  [X]      Improving slowly and progressing toward goals  [ ]      Not making progress toward goals and plan of care will be adjusted       PLAN:  Patient continues to benefit from skilled intervention to address the above impairments.   Continue treatment per established plan of care.  Discharge Recommendations:  Rehab and [facility]  Further Equipment Recommendations for Discharge:  rolling walker       SUBJECTIVE:   Patient stated .      OBJECTIVE DATA SUMMARY:   Critical Behavior:  Neurologic State: Alert  Orientation Level: Oriented X4  Cognition: Appropriate decision making, Appropriate for age attention/concentration, Follows commands  Safety/Judgement: Awareness of environment, Fall prevention  Functional Mobility Training:  Bed Mobility:  Rolling: Stand-by asssistance;Contact guard assistance  Transfers:  Sit to Stand: Stand-by asssistance (vc's for BUE positioning)  Stand to Sit: Stand-by asssistance  Balance:  Sitting: Impaired  Sitting - Static: Good (unsupported)  Sitting - Dynamic: Fair (occasional)  Standing: Impaired  Standing - Static: Fair;Good  Standing - Dynamic : Fair  Ambulation/Gait Training:  Distance (ft): 45 Feet (ft) (x2)  Assistive Device: Brace/Splint; Walker, rollator  Ambulation - Level of Assistance: Contact guard assistance;Stand-by asssistance  Gait Abnormalities: Antalgic; Step to gait  Base of Support: Center of gravity altered  Speed/Janice: Slow  Step Length: Left shortened;Right shortened  Interventions: Safety awareness training;Verbal cues; Visual/Demos     Neuro Re-Education:  Upright posture in standing     Pain:4/10 pre andpost   Pain Scale 1: Numeric (0 - 10)  Activity Tolerance:   fair  Please refer to the flowsheet for vital signs taken during this treatment.   After treatment:   [ ] Patient left in no apparent distress sitting up in chair  [X] Patient left in no apparent distress in bed  [X] Call bell left within reach  [X] Nursing notified  [ ] Caregiver present  [ ] Bed alarm activated      Calvin Melendrez PT   Time Calculation: 10 mins

## 2017-09-05 NOTE — ROUTINE PROCESS
Plan:  To provide an enjoyable diversion. Implementation:  Provided live bedside harp music, varied styles of music. Evaluation:  Patient eating lunch, enjoying music while awaiting discharge to rehab.

## 2017-09-06 NOTE — ROUTINE PROCESS
1313 TRANSFER - OUT REPORT:    Verbal report given to Compa Trujillo (name) on [facility]  being transferred to SNF(unit) for routine progression of care       Report consisted of patients Situation, Background, Assessment and   Recommendations(SBAR). Information from the following report(s) SBAR, Kardex and MAR was reviewed with the receiving nurse. Lines:       Opportunity for questions and clarification was provided.       Patient transported with:   brace, glasses, belongings

## 2022-07-11 NOTE — PROGRESS NOTES
Progress Note    Patient: Carolynn [facility] MRN: [id number]  SSN: [id number]    YOB: 1950  Age: 78 y.o. Sex: female      Admit Date: 8/29/2017    LOS: 1 day     Subjective:   Doing well. Denies leg pain/paresthesias/ weakness. Adi PO. Approp incisional pain. Objective:     Vitals:    08/29/17 1410 08/29/17 1958 08/30/17 0546 08/30/17 0733   BP: 126/75 120/66 114/68 119/56   Pulse: (!) 111 92 85 76   Resp: 10 12 14 18   Temp: 98.1 °F (36.7 °C) 97.8 °F (36.6 °C) 97.6 °F (36.4 °C) 98 °F (36.7 °C)   SpO2: 94% 94% 94% 96%   Weight:       Height:            Intake and Output:  Current Shift: 08/30 0701 - 08/30 1900  In: 360 [P.O.:360]  Out: 600 [Urine:600]  Last three shifts: 08/28 1901 - 08/30 0700  In: 2317.5 [P.O.:360; I.V.:1957.5]  Out: 1525 [Urine:1500]    Physical Exam:   Alert and appropriate. Motor 5/5 for all save trace left HF weakness (expected). Lt grossly intact. Wound c/d/i; abd benign. Thighs and calves non-tender to deep palpation. Lab/Data Review:  No results found for this or any previous visit (from the past 24 hour(s)). Assessment:     Active Problems:    Lumbar scoliosis (8/29/2017)        Plan:   1. Mobilize  2.  NPO after midnight-- second stage in AM    Signed By: Christianne Arteaga MD     August 30, 2017 FEVER/HEADACHE

## (undated) DEVICE — SSC BONE WAX: Brand: SSC BONE WAX

## (undated) DEVICE — TUBERCULIN SAFETY SYRINGE WITH NEEDLE: Brand: MONOJECT

## (undated) DEVICE — ASTOUND STANDARD SURGICAL GOWN, XXL: Brand: CONVERTORS

## (undated) DEVICE — TABLE COVER: Brand: CONVERTORS

## (undated) DEVICE — INTENDED FOR TISSUE SEPARATION, AND OTHER PROCEDURES THAT REQUIRE A SHARP SURGICAL BLADE TO PUNCTURE OR CUT.: Brand: BARD-PARKER ® DISPOSABLE SCALPELS

## (undated) DEVICE — DRAPE TBL W72XH34IN D30IN SGL PC DISPOSABLE

## (undated) DEVICE — MODULE EMG W/ NVM5 HARN 2 NEEDLE/NEUROVISION 2 SLD GEL

## (undated) DEVICE — SPONGE LAP 18X18IN STRL -- 5/PK

## (undated) DEVICE — DRESSING TRNSPAR FLM 4X5IN OPSITE

## (undated) DEVICE — PREP SKN DURAPREP 26ML APPL --

## (undated) DEVICE — 20 ML SYRINGE LUER-LOCK TIP: Brand: MONOJECT

## (undated) DEVICE — INCISE SURGICAL DRAPE: Brand: OPSITE INCISE 14X25CM CTN 20

## (undated) DEVICE — Device

## (undated) DEVICE — DRAPE SHEET, X-LARGE: Brand: CONVERTORS

## (undated) DEVICE — BLADE ELECTRODE: Brand: EDGE

## (undated) DEVICE — 12FR FRAZIER SUCTION HANDLE: Brand: CARDINAL HEALTH

## (undated) DEVICE — KIT DIL PRB K WIRE DISP FOR EXTRM LUM INTBDY FUS

## (undated) DEVICE — THE MILL DISPOSABLE - FINE

## (undated) DEVICE — 10FR FRAZIER SUCTION HANDLE: Brand: CARDINAL HEALTH

## (undated) DEVICE — 8FR FRAZIER SUCTION HANDLE: Brand: CARDINAL HEALTH

## (undated) DEVICE — ELECTRODE EMG NDL SSEP DISP NVM5

## (undated) DEVICE — BLADE ELECTRODE: Brand: VALLEYLAB

## (undated) DEVICE — TOOL 14MH30 LEGEND 14CM 3MM: Brand: MIDAS REX ™

## (undated) DEVICE — SUTURE VCRL SZ 0 L18IN ABSRB VLT L36MM CT-1 1/2 CIR J740D

## (undated) DEVICE — BIPOLAR FORCEPS CORD,BANANA LEADS: Brand: VALLEYLAB

## (undated) DEVICE — TUBE SUCT 10FR L3 7/8IN ANG DBL ACT FNGR VLV OBT MATTE

## (undated) DEVICE — SKIN MARKER,REGULAR TIP WITH RULER AND LABELS: Brand: DEVON

## (undated) DEVICE — WAX SURG 2.5GM HEMSTAT BNE BEESWAX PARAFFIN ISO PALMITATE

## (undated) DEVICE — KENDALL SCD EXPRESS SLEEVES, KNEE LENGTH, MEDIUM: Brand: KENDALL SCD

## (undated) DEVICE — 3M™ IOBAN™ 2 ANTIMICROBIAL INCISE DRAPE 6650EZ: Brand: IOBAN™ 2

## (undated) DEVICE — ZINACTIVE USE 2641837 CLIP LIG M BLU TI HRT SHP WIRE HORZ 600 PER BX

## (undated) DEVICE — KIT RETRCT SHIM DISP FOR MINIMAL ACCS SYS MAXCESS 4

## (undated) DEVICE — PROBE NRV STIM BALL TIP DISP M5

## (undated) DEVICE — INSULATED BLADE ELECTRODE: Brand: EDGE

## (undated) DEVICE — SYRINGE MED 20ML STD CLR PLAS LUERLOCK TIP N CTRL DISP

## (undated) DEVICE — PREP CHLORAPREP 10.5 ML ORG --

## (undated) DEVICE — SUTURE VCRL SZ 2-0 L18IN ABSRB UD CT-1 L36MM 1/2 CIR J839D

## (undated) DEVICE — CODMAN® SURGICAL PATTIES 1/2" X1 1/2" (1.27CM X 3.81CM): Brand: CODMAN®

## (undated) DEVICE — DRAPE C-ARMOUR C-ARM KIT --

## (undated) DEVICE — (D)SYR 10ML SLIP TIP 1/5ML GRD -- DISC BY MFR USE ITEM 338000

## (undated) DEVICE — HEX-LOCKING BLADE ELECTRODE: Brand: EDGE

## (undated) DEVICE — SNAP KOVER: Brand: UNBRANDED

## (undated) DEVICE — COVER LT HNDL BLU PLAS

## (undated) DEVICE — SYR LR LCK 1ML GRAD NSAF 30ML --

## (undated) DEVICE — IMPLANTABLE DEVICE
Type: IMPLANTABLE DEVICE | Site: SPINE LUMBAR | Status: NON-FUNCTIONAL
Removed: 2017-08-31

## (undated) DEVICE — NDL PRT INJ NSAF BLNT 18GX1.5 --

## (undated) DEVICE — DRAPE MICSCP W52XL154IN XLN EXTRA W 2 BRDG STEREO OBS DISP

## (undated) DEVICE — SUTURE COAT VCRL SZ 0 L18IN ABSRB UD CTX L48MM 1/2 CIR J724D

## (undated) DEVICE — NEEDLE NRV STIM BVL TIP INSUL PEDCL ACCS SYS FOR EMG MON

## (undated) DEVICE — TOOL 14CY50 LEGEND 14CM 5MM CY: Brand: MIDAS REX ™

## (undated) DEVICE — CATHETER DRNGE 32FR 4 WNG DISP FOR NEPHSTMY MALECOTS

## (undated) DEVICE — CODMAN® SURGICAL PATTIES 1/2" X 3" (1.27CM X 7.62CM): Brand: CODMAN®

## (undated) DEVICE — SPONGE GZ W4XL4IN COT 12 PLY TYP VII WVN C FLD DSGN

## (undated) DEVICE — SKIN CLOS DERMABND PRINEO 60CM -- DERMABOUND PRINEO

## (undated) DEVICE — X-RAY DETECTABLE SPONGES,12 PLY: Brand: VISTEC

## (undated) DEVICE — INTENDED FOR TISSUE SEPARATION, AND OTHER PROCEDURES THAT REQUIRE A SHARP SURGICAL BLADE TO PUNCTURE OR CUT.: Brand: BARD-PARKER ®  SAFETY SCALPED

## (undated) DEVICE — SPINE PACK DEPAUL: Brand: MEDLINE INDUSTRIES, INC.

## (undated) DEVICE — CATHETER IV 14GA L1.25IN ORNG POLY SFTY SYS FULL ENCASED

## (undated) DEVICE — CATHETER IV 10GA L3IN OD3.3-3.5MM ID2.64-2.74MM BRN FEP

## (undated) DEVICE — TOWEL: OR BLU 80/CS: Brand: MEDICAL ACTION INDUSTRIES

## (undated) DEVICE — STERILE POLYISOPRENE POWDER-FREE SURGICAL GLOVES: Brand: PROTEXIS

## (undated) DEVICE — ASPIRATION KIT

## (undated) DEVICE — (D)SYR 10ML 1/5ML GRAD NSAF -- PKGING CHANGE USE ITEM 338027

## (undated) DEVICE — TOOL DC SP12MH30 LGD 12CM PROX 3.0 MH: Brand: MIDAS REX CLEARVIEW™

## (undated) DEVICE — DISPOSABLE OR TOWEL: Brand: CARDINAL HEALTH

## (undated) DEVICE — NEEDLE HYPO 22GA L1.5IN BLK POLYPR HUB S STL REG BVL STR

## (undated) DEVICE — SOLUTION IV 1000ML 0.9% SOD CHL

## (undated) DEVICE — SUTURE STRATAFIX SPRL MCRYL + SZ 4-0 L12IN ABSRB UD PS-2 SXMP1B117

## (undated) DEVICE — SUTURE VCRL SZ 3-0 L18IN ABSRB UD L26MM SH 1/2 CIR J864D

## (undated) DEVICE — KIT CATH OD16FR 5ML BLLN SIL URIN INDWL STR TIP INF CTRL

## (undated) DEVICE — CODMAN® SURGICAL PATTIES 1/2" X 1/2" (1.27CM X 1.27CM): Brand: CODMAN®

## (undated) DEVICE — CLIP NRV STIM DYN INLINE ACT M5

## (undated) DEVICE — SYR 10ML CTRL LR LCK NSAF LF --

## (undated) DEVICE — SUTURE MCRYL SZ 4-0 L18IN ABSRB UD L19MM PS-2 3/8 CIR PRIM Y496G

## (undated) DEVICE — KIT JACK TBL PT CARE

## (undated) DEVICE — FLOSEAL MATRIX IS INDICATED IN SURGICAL PROCEDURES (OTHER THAN IN OPHTHALMIC) AS AN ADJUNCT TO HEMOSTASIS WHEN CONTROL OF BLEEDING BY LIGATURE OR CONVENTIONALPROCEDURES IS INEFFECTIVE OR IMPRACTICAL.: Brand: FLOSEAL HEMOSTATIC MATRIX

## (undated) DEVICE — REM POLYHESIVE ADULT PATIENT RETURN ELECTRODE: Brand: VALLEYLAB